# Patient Record
Sex: FEMALE | Race: WHITE | Employment: STUDENT | URBAN - METROPOLITAN AREA
[De-identification: names, ages, dates, MRNs, and addresses within clinical notes are randomized per-mention and may not be internally consistent; named-entity substitution may affect disease eponyms.]

---
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
## 2022-01-08 ENCOUNTER — TELEPHONE (OUTPATIENT)
Dept: INTERNAL MEDICINE CLINIC | Age: 22
End: 2022-01-08

## 2022-01-17 ENCOUNTER — TELEPHONE (OUTPATIENT)
Dept: INTERNAL MEDICINE CLINIC | Age: 22
End: 2022-01-17

## 2022-01-17 NOTE — TELEPHONE ENCOUNTER
Left message for Sunshine Crooked on her voicemail. The insurance company is apparently requesting additional information regarding procedure code [id number] and I am unsure what that is.

## 2022-01-20 ENCOUNTER — OFFICE VISIT (OUTPATIENT)
Dept: PSYCHIATRY | Age: 22
End: 2022-01-20
Payer: COMMERCIAL

## 2022-01-20 VITALS
WEIGHT: 127.2 LBS | DIASTOLIC BLOOD PRESSURE: 82 MMHG | RESPIRATION RATE: 12 BRPM | BODY MASS INDEX: 22.18 KG/M2 | HEART RATE: 60 BPM | SYSTOLIC BLOOD PRESSURE: 116 MMHG

## 2022-01-20 DIAGNOSIS — F43.10 PTSD (POST-TRAUMATIC STRESS DISORDER): ICD-10-CM

## 2022-01-20 DIAGNOSIS — F31.62 BIPOLAR DISORDER, CURRENT EPISODE MIXED, MODERATE (HCC): Primary | ICD-10-CM

## 2022-01-20 DIAGNOSIS — F40.10 SOCIAL ANXIETY DISORDER: ICD-10-CM

## 2022-01-20 PROCEDURE — 99214 OFFICE O/P EST MOD 30 MIN: CPT | Performed by: STUDENT IN AN ORGANIZED HEALTH CARE EDUCATION/TRAINING PROGRAM

## 2022-01-20 PROCEDURE — G8428 CUR MEDS NOT DOCUMENT: HCPCS | Performed by: STUDENT IN AN ORGANIZED HEALTH CARE EDUCATION/TRAINING PROGRAM

## 2022-01-20 PROCEDURE — G8482 FLU IMMUNIZE ORDER/ADMIN: HCPCS | Performed by: STUDENT IN AN ORGANIZED HEALTH CARE EDUCATION/TRAINING PROGRAM

## 2022-01-20 PROCEDURE — G8420 CALC BMI NORM PARAMETERS: HCPCS | Performed by: STUDENT IN AN ORGANIZED HEALTH CARE EDUCATION/TRAINING PROGRAM

## 2022-01-20 PROCEDURE — 1036F TOBACCO NON-USER: CPT | Performed by: STUDENT IN AN ORGANIZED HEALTH CARE EDUCATION/TRAINING PROGRAM

## 2022-01-20 RX ORDER — LITHIUM CARBONATE 300 MG
600 TABLET ORAL DAILY
Qty: 28 TABLET | Refills: 0 | Status: SHIPPED | OUTPATIENT
Start: 2022-01-20 | End: 2022-02-03 | Stop reason: SDUPTHER

## 2022-01-20 RX ORDER — HYDROXYZINE HYDROCHLORIDE 25 MG/1
25 TABLET, FILM COATED ORAL DAILY PRN
Qty: 14 TABLET | Refills: 0 | Status: SHIPPED | OUTPATIENT
Start: 2022-01-20 | End: 2022-03-09 | Stop reason: SDUPTHER

## 2022-01-20 ASSESSMENT — ENCOUNTER SYMPTOMS
EYES NEGATIVE: 1
RESPIRATORY NEGATIVE: 1
GASTROINTESTINAL NEGATIVE: 1
ALLERGIC/IMMUNOLOGIC NEGATIVE: 1

## 2022-01-20 ASSESSMENT — ANXIETY QUESTIONNAIRES
GAD7 TOTAL SCORE: 21
5. BEING SO RESTLESS THAT IT IS HARD TO SIT STILL: 3
3. WORRYING TOO MUCH ABOUT DIFFERENT THINGS: 3
2. NOT BEING ABLE TO STOP OR CONTROL WORRYING: 3
7. FEELING AFRAID AS IF SOMETHING AWFUL MIGHT HAPPEN: 3
IF YOU CHECKED OFF ANY PROBLEMS ON THIS QUESTIONNAIRE, HOW DIFFICULT HAVE THESE PROBLEMS MADE IT FOR YOU TO DO YOUR WORK, TAKE CARE OF THINGS AT HOME, OR GET ALONG WITH OTHER PEOPLE: EXTREMELY DIFFICULT
1. FEELING NERVOUS, ANXIOUS, OR ON EDGE: 3
6. BECOMING EASILY ANNOYED OR IRRITABLE: 3
4. TROUBLE RELAXING: 3

## 2022-01-20 ASSESSMENT — PATIENT HEALTH QUESTIONNAIRE - PHQ9
SUM OF ALL RESPONSES TO PHQ QUESTIONS 1-9: 27
1. LITTLE INTEREST OR PLEASURE IN DOING THINGS: 3
2. FEELING DOWN, DEPRESSED OR HOPELESS: 3
7. TROUBLE CONCENTRATING ON THINGS, SUCH AS READING THE NEWSPAPER OR WATCHING TELEVISION: 3
4. FEELING TIRED OR HAVING LITTLE ENERGY: 3
5. POOR APPETITE OR OVEREATING: 3
SUM OF ALL RESPONSES TO PHQ QUESTIONS 1-9: 27
6. FEELING BAD ABOUT YOURSELF - OR THAT YOU ARE A FAILURE OR HAVE LET YOURSELF OR YOUR FAMILY DOWN: 3
SUM OF ALL RESPONSES TO PHQ QUESTIONS 1-9: 27
3. TROUBLE FALLING OR STAYING ASLEEP: 3
SUM OF ALL RESPONSES TO PHQ9 QUESTIONS 1 & 2: 6
8. MOVING OR SPEAKING SO SLOWLY THAT OTHER PEOPLE COULD HAVE NOTICED. OR THE OPPOSITE, BEING SO FIGETY OR RESTLESS THAT YOU HAVE BEEN MOVING AROUND A LOT MORE THAN USUAL: 3
9. THOUGHTS THAT YOU WOULD BE BETTER OFF DEAD, OR OF HURTING YOURSELF: 3
SUM OF ALL RESPONSES TO PHQ QUESTIONS 1-9: 24

## 2022-01-20 ASSESSMENT — COLUMBIA-SUICIDE SEVERITY RATING SCALE - C-SSRS
6. HAVE YOU EVER DONE ANYTHING, STARTED TO DO ANYTHING, OR PREPARED TO DO ANYTHING TO END YOUR LIFE?: NO
2. HAVE YOU ACTUALLY HAD ANY THOUGHTS OF KILLING YOURSELF?: YES
3. HAVE YOU BEEN THINKING ABOUT HOW YOU MIGHT KILL YOURSELF?: YES
5. HAVE YOU STARTED TO WORK OUT OR WORKED OUT THE DETAILS OF HOW TO KILL YOURSELF? DO YOU INTEND TO CARRY OUT THIS PLAN?: YES
4. HAVE YOU HAD THESE THOUGHTS AND HAD SOME INTENTION OF ACTING ON THEM?: NO
1. WITHIN THE PAST MONTH, HAVE YOU WISHED YOU WERE DEAD OR WISHED YOU COULD GO TO SLEEP AND NOT WAKE UP?: YES

## 2022-01-20 NOTE — PROGRESS NOTES
PSYCHIATRY PROGRESS NOTE    Romel Klein  2000  01/20/22  Face to Face time: 35 minutes  PCP: Laban Mcardle, MD    CC:   Chief Complaint   Patient presents with    Depression     Patient is a 74-year-old female with significant past medical history of spondylosis and choreiform movements who presents to the outpatient psychiatric clinic today for evaluation of depression, anxiety, manic behaviors, and concerns of ADHD. A:  Patient's presentation today appears to be representative of incomplete coverage of her depressive moods by the addition of the lithium from last visit. Whether this is secondary to her underlying bipolar disorder or the recent traumatic loss of her good friend is unclear at this time. Given the severity of the patient's symptoms, there is further medication management that is merited.     Diagnosis:  Bipolar 1 disorder, current episode moderate mixed symptoms  PTSD  Social anxiety disorder  Cannabis use disorder    P:   1. We will plan to increase the patient's lithium from 300 mg to 600 mg at this time for better coverage of her bipolar depressive symptoms. 2.  We will prescribe a 2-week course of hydroxyzine 25 mg to be used as needed daily for anxiety episodes. Patient was cautioned on potential side effects of this medication including dry mouth, sedation, abnormal thoughts. 3.  We will plan to have close follow-up of the patient for management of the continued suicidal ideations as well as mood dysregulations. 4.  Patient will contact her insurance company to determine potential eligible therapy options that may be in the community. She was advised to consider options that may be in the realm of supportive or CBT based psychotherapy.     Medication Monitoring:    - PDMP reviewed: Zolpidem prescription filled on 12/23/2021 for 5-day prescription    Follow-up: 2 weeks    Safety: Pt was counseled on the potential for increased suicidal ideations and advised on potential options for dealing with these including hotlines, calling the office, or going to the nearest emergency room. __________________________________________________________________________    S:   Patient states that despite her previous message saying that she was experiencing a improvement in her symptomology, she has actually maintained at a depressive state for a prolonged period of time. She identified that this may have stemmed from a close friend of hers who committed suicide on New Year's in the setting of alcohol use. His  was noted to be last week. Along with this, the patient has experienced a worsening of her mood, an increase in her sleep to the point where she will often be in the bed for 16 to 17 hours. This is consistent with her prior descriptions of her depressive episodes. She does state that she is making an effort to continue to go to classes, however she notes that she is making inattentive errors in the classes when she knows that she could/should be doing better. The only thing that the patient identifies is slightly better is that she no longer is experiencing the auditory hallucinations that she had described at the prior visit. These had been associated previously with her severe mood states including both the manic and depressive states. She identified that they have not been as present this time and have not been contributing to a worsening of her mood. She does, however, state that there is a possible increase in dissociative episodes as she feels that she loses track of time more often now without explanation. At today's evaluation, the patient denied that she has had suicidal ideations today but noted them within the past week. She stated that those ideations were mostly passive in nature without a specific plan or intent identified. Patient denied any homicidal ideations at this time. ROS:  Review of Systems   Constitutional: Positive for fatigue. HENT: Negative. Eyes: Negative. Respiratory: Negative. Cardiovascular: Negative. Gastrointestinal: Negative. Genitourinary: Negative. Musculoskeletal: Negative. Skin: Negative. Allergic/Immunologic: Negative. Neurological: Negative. Hematological: Negative. Psychiatric/Behavioral: Positive for decreased concentration, dysphoric mood, sleep disturbance and suicidal ideas. The patient is nervous/anxious. Brief Medical Hx:   Patient Active Problem List   Diagnosis    PTSD (post-traumatic stress disorder)    Bipolar disorder, current episode mixed, moderate (HCC)    Social anxiety disorder        Brief Psych Hx:  Hosp: Denied  Diagnoses: Major depression  Med trials: Fluoxetine, trazodone, bupropion, sertraline  Outpt: History of multiple therapists however none currently  NSSI: Burning (lighter/pan)  Suicide Attempts: Denied    O:  Wt Readings from Last 3 Encounters:   01/20/22 127 lb 3.2 oz (57.7 kg) (50 %, Z= 0.00)*   12/23/21 125 lb 3.2 oz (56.8 kg) (46 %, Z= -0.09)*   12/22/21 122 lb 9.6 oz (55.6 kg) (41 %, Z= -0.22)*     * Growth percentiles are based on CDC (Girls, 2-20 Years) data. Vitals:    01/20/22 1500   BP: 116/82   Site: Right Upper Arm   Pulse: 60   Resp: 12   Weight: 127 lb 3.2 oz (57.7 kg)       Mental Status Exam:   Appearance:    Appropriately dressed  Motor: No abnormal movements, tics or mannerisms.   Eye Contact: Generally good  Speech:    Soft tone, mildly slowed rate  Language:   Appropriate diction  Mood/Affect:   \"Very depressed\"/blunted affect that converged on being flat at times  Thought Process:    Linear, logical  Thought Content:    Depressive/anxious content predominating, no SI/HI verbalized  Hallucinations:   Denied, not seem to be responding to internal stimuli  Associations:   Intact, no episodes of dissociations occurring during the visit  Attention/Concentration:   Intact  Orientation:    Alert and oriented x4  Memory: Intact  Fund of Knowledge:    Appropriate for age and education  Insight/Judgement:   Intact/intact      MOHAN-7 SCREENING 1/20/2022 12/23/2021   Feeling nervous, anxious, or on edge Nearly every day Nearly every day   Not being able to stop or control worrying Nearly every day Nearly every day   Worrying too much about different things Nearly every day Nearly every day   Trouble relaxing Nearly every day Nearly every day   Being so restless that it is hard to sit still Nearly every day Nearly every day   Becoming easily annoyed or irritable Nearly every day Nearly every day   Feeling afraid as if something awful might happen Nearly every day Nearly every day   MOHAN-7 Total Score 21 21     PHQ-9 Questionaire 1/20/2022 12/23/2021   Little interest or pleasure in doing things 3 3   Feeling down, depressed, or hopeless 3 3   Trouble falling or staying asleep, or sleeping too much 3 3   Feeling tired or having little energy 3 3   Poor appetite or overeating 3 3   Feeling bad about yourself - or that you are a failure or have let yourself or your family down 3 3   Trouble concentrating on things, such as reading the newspaper or watching television 3 3   Moving or speaking so slowly that other people could have noticed.  Or the opposite - being so fidgety or restless that you have been moving around a lot more than usual 3 3   Thoughts that you would be better off dead, or of hurting yourself in some way 3 1   PHQ-9 Total Score 27 25   If you checked off any problems, how difficult have these problems made it for you to do your work, take care of things at home, or get along with other people? - 3        Labs:     Orders Only on 09/29/2021   Component Date Value Ref Range Status    [facility] 09/29/2021 7.0  mIU/mL Final    Comment: Default Normal Ranges    Female                        Male  Follicular:  5.9-96.3      1.4-18.1  Midcycle:    3.4-33.4  Luteal:      1.5-9.1  Pregnant:    <0.3  Postmeno:    23.0-116.3      LH 09/29/2021 16.0  mIU/mL Final    Comment:                     Default Normal Ranges    Female                   Male  Follic:  0.7-05.7        1.5-9.3  Midcycle:  8.7-76.3  Luteal:  0.5-16.9  Pregnant:  <0.1-1.5  Postmeno:  15.9-54  Contracep:  0.7-5.6                         Age Related Normal Ranges        0 to 6 Years  Female:  Not Established  Male:    Not Established          15 Years  Female:  <0.1-6.0  Male:    <0.1-6.0          21 Years  Female:                   Male: Follic:  9.3-12.2         Not Established  Midcycle:  8.7-76.3  Luteal:  0.5-16.9  Pregnant:  <0.1-1.15  Postmeno:  15.9-54  Contracep:  0.7-5.6         79 Years  Female:                  Male: Follic:  3.7-11.3        1.5-9.3  Midcycle:  8.7-76.3  Luteal:  0.5-16.9  Pregnant:  <0.1-1.5  Postmeno:  15.9-54  Contracep:  0.7-5.6         89 Years  Female:                  Male:   Follic:  0.2-75.3        3.1-34.6  Midcycle:  8.7-76.3  Luteal:  0.5-16.9  Pregnant:  <0.1-1.5  Postmeno:  15.9-54  Contracep:  0.7-5.6            Prolactin 09/29/2021 6.8  ng/mL Final    Comment: Default Normal Ranges    Female                     Male  Nonpregnant: 2.8-29.2      2.1-17.7  Pregnant:  9.7-208.5  Postmeno:  1.8-20.3      TSH 09/29/2021 1.07  0.43 - 4.00 uIU/mL Final    WBC 09/29/2021 9.0  4.0 - 11.0 K/uL Final    RBC 09/29/2021 3.84* 4.00 - 5.20 M/uL Final    Hemoglobin 09/29/2021 12.4  12.0 - 16.0 g/dL Final    Hematocrit 09/29/2021 37.3  36.0 - 48.0 % Final    MCV 09/29/2021 97.1  80.0 - 100.0 fL Final    MCH 09/29/2021 32.3  26.0 - 34.0 pg Final    MCHC 09/29/2021 33.3  31.0 - 36.0 g/dL Final    RDW 09/29/2021 13.4  12.4 - 15.4 % Final    Platelets 42/58/4801 247  135 - 450 K/uL Final    MPV 09/29/2021 9.0  5.0 - 10.5 fL Final    Ferritin 09/29/2021 23.1  15.0 - 150.0 ng/mL Final    Iron 09/29/2021 121  37 - 145 ug/dL Final    TIBC 09/29/2021 352  260 - 445 ug/dL Final    Iron Saturation 09/29/2021 34  15 - 50 % Final    Cholesterol, Total 09/29/2021 183  0 - 199 mg/dL Final    Triglycerides 09/29/2021 59  0 - 150 mg/dL Final    HDL 09/29/2021 89* 40 - 60 mg/dL Final    LDL Calculated 09/29/2021 82  <100 mg/dL Final    VLDL Cholesterol Calculated 09/29/2021 12  Not Established mg/dL Final    Sodium 09/29/2021 140  136 - 145 mmol/L Final    Potassium 09/29/2021 4.4  3.5 - 5.1 mmol/L Final    Chloride 09/29/2021 101  99 - 110 mmol/L Final    CO2 09/29/2021 27  21 - 32 mmol/L Final    Anion Gap 09/29/2021 12  3 - 16 Final    Glucose 09/29/2021 76  70 - 99 mg/dL Final    BUN 09/29/2021 15  7 - 20 mg/dL Final    CREATININE 09/29/2021 0.9  0.6 - 1.1 mg/dL Final    GFR Non- 09/29/2021 >60  >60 Final    Comment: >60 mL/min/1.73m2 EGFR, calc. for ages 25 and older using the  MDRD formula (not corrected for weight), is valid for stable  renal function.  GFR  09/29/2021 >60  >60 Final    Comment: Chronic Kidney Disease: less than 60 ml/min/1.73 sq.m. Kidney Failure: less than 15 ml/min/1.73 sq.m. Results valid for patients 18 years and older.       Calcium 09/29/2021 10.5  8.3 - 10.6 mg/dL Final    Total Protein 09/29/2021 7.1  6.4 - 8.2 g/dL Final    Albumin 09/29/2021 5.0  3.4 - 5.0 g/dL Final    Albumin/Globulin Ratio 09/29/2021 2.4* 1.1 - 2.2 Final    Total Bilirubin 09/29/2021 0.6  0.0 - 1.0 mg/dL Final    Alkaline Phosphatase 09/29/2021 67  40 - 129 U/L Final    ALT 09/29/2021 16  10 - 40 U/L Final    AST 09/29/2021 17  15 - 37 U/L Final    Globulin 09/29/2021 2.1  g/dL Final    Hep C Ab Interp 09/29/2021 Non-reactive  Non-reactive Final    HIV Ag/Ab 09/29/2021 Non-Reactive  Non-reactive Final    HIV-1 Antibody 09/29/2021 Non-Reactive  Non-reactive Final    HIV ANTIGEN 09/29/2021 Non-Reactive  Non-reactive Final    HIV-2 Ab 09/29/2021 Non-Reactive  Non-reactive Final    17-OH PROGESTERONE LCMS 09/29/2021 91.41  <=206.00 ng/dL Final    Comment: INTERPRETIVE INFORMATION for 17-Hydroxyprogesterone in females: Follicular                  15 to 70 ng/dL  Luteal                      35 to 290 ng/dL  REFERENCE INTERVAL: 17-Hydroxyprogesterone Qnt, HPLC-MS/MS  Access complete set of age- and/or gender-specific reference intervals  for  this test in the [facility] Test Directory ([place]). This test was developed and its performance characteristics determined  by  [facility]. It has not been cleared or approved by the [employer]  and  Drug Administration. This test was performed in a CLIA certified  laboratory  and is intended for clinical purposes. Performed By: [facility] [address]  [place], [address] [facility]  : Raya Sosa.  Maryse Ford MD      Testosterone 09/29/2021 44  20 - 70 ng/dL Final    [address] [phone]    DHEAS (DHEA Sulfate) 09/29/2021 189.0  63 - 373 ug/dL Final    [address] [phone]     Lithium level: 1/20/2022 0.5    EKG: None on file        Yanni Robin MD  Psychiatrist

## 2022-02-02 NOTE — PROGRESS NOTES
PSYCHIATRY PROGRESS NOTE    Laura Do  2000  02/03/22  Face to Face time: 30 minutes  Virtual visit w/ audio/video capabilities  PCP: Elfego Madera MD    CC:   Chief Complaint   Patient presents with    Manic Behavior     Patient is a 79-year-old female with significant past medical history of spondylosis and choreiform movements who presents to the outpatient psychiatric clinic today for evaluation of depression, anxiety, manic behaviors, and concerns of ADHD. A:  Patient's presentation today appears indicative of having recently had manic episode that lasted approximately 3 days in length. Notable that during the manic episode she also experienced suicidal ideations without clear plan or intent. Those are not present today however it is a concern that even with lithium that she has experienced these.     Diagnosis:  Bipolar 1 disorder, most recent episode manic  PTSD  Social anxiety disorder  Cannabis use disorder    P:   1. We will plan to maintain the patient on lithium 600 mg daily for treatment of bipolar disorder at this time. Lithium level has also been ordered to determine whether this medication can be increased further. Last lithium level 0.5 indicative of subtherapeutic level, however the dosage of her lithium has been increased since that time and a new level will need to be obtained to determine where she falls in the therapeutic index. 2.  We will plan to start olanzapine 5 mg nightly for treatment of rani. This is also geared to help treat the patient's concerns of insomnia and appetite suppression. Patient was cautioned on potential orthostatic changes with this medication as well as its potential for weight gain. She was advised of the potential concerning side effect of NMS that can sometimes occur and advised to seek medical help should this occur for her.   3.  The patient was reminded of potential scenarios that may require inpatient hospitalization including severe manic episodes that do not and, suicidal thoughts, severe depressive episodes leading to immobility, severe hallucinations. Patient was advised to consider contacting this writer either through my chart or through the clinic should she feel that her moods begin to swing up or down and at least receive advice. Patient indicated understanding of such. Medication Monitoring:    - PDMP reviewed: No interval change    Follow-up: 2 weeks or sooner as needed. Patient will also plan to send a message after approximately 1 week to advise writer on the progress of medication    Safety: Pt was counseled on the potential for increased suicidal ideations and advised on potential options for dealing with these including hotlines, calling the office, or going to the nearest emergency room. __________________________________________________________________________    S:   Patient identified that she had been doing mildly better up until the middle of last week. She identified that from Tuesday to Friday of last week that she experienced only minimal sleep each night if at all. She noted that there was a decreased need for sleep and that she was \"almost afraid of my bed\". She identified that during the time that she would be up in the middle of the night that she would watch TV, drive to her home and work out, and engaging coloring. She also noted that she would go to see friends that were up. Patient denied any additional substances that were in play during this time, noting even that she has stopped smoking marijuana as of this past Sunday (after the manic episode had occurred). Patient acknowledged that during the manic episode she was still going to class but that she would have what appeared to be near dissociative episodes where she would experience almost an out of body feeling. Patient also identified that during depressive episodes she feels that she zones out more in class.   During this most recent manic episode, the patient identified that she did experience a sensation of \"needing to jump out of my body\", which she said led her to having thoughts about wanting to hurt herself or to die. She did not come up with a plan, no intent was had, and no actual attempts were made. However, the patient identified that that particular scenario had only happened 2 times prior, noting that this current one was not as severe as the times prior. Patient also indicated that she does intermittently still experience auditory hallucinations that she can barely make out, noting that they have also dampened since the lithium has been in place. Additional information provided by the patient indicated that since stopping the marijuana she has also struggled with having decreased appetite and motivation. Patient denied any suicidality, homicidality, AVH during the visit today. ROS:  Review of Systems   Constitutional: Positive for activity change and appetite change. HENT: Negative. Eyes: Negative. Respiratory: Negative. Cardiovascular: Negative. Gastrointestinal: Negative. Endocrine: Negative. Genitourinary: Negative. Musculoskeletal: Negative. Skin: Negative. Allergic/Immunologic: Negative. Neurological: Negative. Psychiatric/Behavioral: Positive for behavioral problems, dysphoric mood and sleep disturbance. The patient is nervous/anxious.          Brief Medical Hx:   Patient Active Problem List   Diagnosis    PTSD (post-traumatic stress disorder)    Bipolar disorder, current episode mixed, moderate (HCC)    Social anxiety disorder        Brief Psych Hx:  Hosp: Denied  Diagnoses: Major depression  Med trials: Fluoxetine, trazodone, bupropion, sertraline  Outpt: History of multiple therapists however none currently  NSSI: Burning (lighter/pan)  Suicide Attempts: Denied    O:  Wt Readings from Last 3 Encounters:   01/20/22 127 lb 3.2 oz (57.7 kg) (50 %, Z= 0.00)*   12/23/21 125 lb 3.2 oz (56.8 kg) (46 %, Z= -0.09)*   12/22/21 122 lb 9.6 oz (55.6 kg) (41 %, Z= -0.22)*     * Growth percentiles are based on [facility] (Girls, 2-20 Years) data. Weight: Patient reported weight of 125lbs. No other vitals were filed due to the visit being virtual.    Mental Status Exam:   Appearance:    Tired, appropriately dressed  Motor: No abnormal movements, tics or mannerisms. Eye Contact: Good  Speech:    Regular rate and rhythm  Language:   Appropriate diction  Mood/Affect: \"I guess I am okay\", blunted affect  Thought Process:    Linear, logical  Thought Content:    Depressive content present, no SI/HI  Hallucinations:   Denied, not seem to be responding internal stimuli  Associations:   Intact  Attention/Concentration:   Intact  Orientation:    Alert and oriented x4  Memory:   Intact  Fund of Knowledge:    Appropriate for age and education  Insight/Judgement:   Intact/intact      MOHAN-7 SCREENING 1/20/2022 12/23/2021   Feeling nervous, anxious, or on edge Nearly every day Nearly every day   Not being able to stop or control worrying Nearly every day Nearly every day   Worrying too much about different things Nearly every day Nearly every day   Trouble relaxing Nearly every day Nearly every day   Being so restless that it is hard to sit still Nearly every day Nearly every day   Becoming easily annoyed or irritable Nearly every day Nearly every day   Feeling afraid as if something awful might happen Nearly every day Nearly every day   MOHAN-7 Total Score 21 21   How difficult have these problems made it for you to do your work, take care of things at home, or get along with other people?  Extremely difficult Extremely difficult     PHQ-9 Questionaire 1/20/2022 12/23/2021   Little interest or pleasure in doing things 3 3   Feeling down, depressed, or hopeless 3 3   Trouble falling or staying asleep, or sleeping too much 3 3   Feeling tired or having little energy 3 3   Poor appetite or overeating 3 3   Feeling bad conducted with patient's (and/or legal guardian's) consent. The visit was conducted pursuant to the emergency declaration under the [address] [place] [address] authority and the [employer] and [employer] General Act. Patient identification was verified, and a caregiver was present when appropriate. The patient was located at home in a state where the provider was licensed to provide care. --Shahla Mullins MD on 2/3/2022 at 3:19 PM    An electronic signature was used to authenticate this note.

## 2022-02-03 ENCOUNTER — TELEMEDICINE (OUTPATIENT)
Dept: PSYCHIATRY | Age: 22
End: 2022-02-03
Payer: COMMERCIAL

## 2022-02-03 DIAGNOSIS — F31.62 BIPOLAR DISORDER, CURRENT EPISODE MIXED, MODERATE (HCC): Primary | ICD-10-CM

## 2022-02-03 PROCEDURE — G8482 FLU IMMUNIZE ORDER/ADMIN: HCPCS | Performed by: STUDENT IN AN ORGANIZED HEALTH CARE EDUCATION/TRAINING PROGRAM

## 2022-02-03 PROCEDURE — 1036F TOBACCO NON-USER: CPT | Performed by: STUDENT IN AN ORGANIZED HEALTH CARE EDUCATION/TRAINING PROGRAM

## 2022-02-03 PROCEDURE — G8420 CALC BMI NORM PARAMETERS: HCPCS | Performed by: STUDENT IN AN ORGANIZED HEALTH CARE EDUCATION/TRAINING PROGRAM

## 2022-02-03 PROCEDURE — G8428 CUR MEDS NOT DOCUMENT: HCPCS | Performed by: STUDENT IN AN ORGANIZED HEALTH CARE EDUCATION/TRAINING PROGRAM

## 2022-02-03 PROCEDURE — 99214 OFFICE O/P EST MOD 30 MIN: CPT | Performed by: STUDENT IN AN ORGANIZED HEALTH CARE EDUCATION/TRAINING PROGRAM

## 2022-02-03 RX ORDER — LITHIUM CARBONATE 300 MG
600 TABLET ORAL DAILY
Qty: 60 TABLET | Refills: 1 | Status: SHIPPED | OUTPATIENT
Start: 2022-02-03 | End: 2022-03-09 | Stop reason: SDUPTHER

## 2022-02-03 RX ORDER — OLANZAPINE 5 MG/1
5 TABLET ORAL NIGHTLY
Qty: 30 TABLET | Refills: 1 | Status: SHIPPED | OUTPATIENT
Start: 2022-02-03 | End: 2022-03-09

## 2022-02-03 ASSESSMENT — ENCOUNTER SYMPTOMS
ALLERGIC/IMMUNOLOGIC NEGATIVE: 1
EYES NEGATIVE: 1
RESPIRATORY NEGATIVE: 1
GASTROINTESTINAL NEGATIVE: 1

## 2022-03-07 ASSESSMENT — ENCOUNTER SYMPTOMS
ALLERGIC/IMMUNOLOGIC NEGATIVE: 1
GASTROINTESTINAL NEGATIVE: 1
EYES NEGATIVE: 1
RESPIRATORY NEGATIVE: 1

## 2022-03-07 NOTE — PROGRESS NOTES
PSYCHIATRY PROGRESS NOTE    Tima Cohn  2000  03/9/22  Face to Face time: 30 minutes  PCP: Gallo Hernandez MD    CC:   Chief Complaint   Patient presents with    Depression    Manic Behavior    Anxiety     Patient is a 72-year-old female with significant past medical history of spondylosis and choreiform movements who presents to the outpatient psychiatric clinic today for evaluation of depression, anxiety, manic behaviors, and concerns of ADHD. A:  Patient's presentation today appears mildly improved from a depression standpoint. That being said, the patient's report of a previous manic episode occurring 1.5 weeks ago is disturbing and indicative of likely incomplete control of the patient's bipolar disorder. We will work to adjust her medications accordingly.     Diagnosis:  Bipolar 1 disorder, current episode moderate mixed symptoms  PTSD  Social anxiety disorder  Cannabis use disorder    P:   1. We will maintain the patient on 600 mg of lithium daily at this time. Patient is obtaining lithium labs today that may indicate whether this medication can be changed further in the future. 2.  We will increase the patient's olanzapine to 10mg nightly for better control of the manic episodes she experiences. The patient was advised to contact this writer if she feels that she is entering a manic episode, as this 10 mg medication could be changed on a short-term basis to better cover her symptoms. 3.  The patient will continue on hydroxyzine 25 mg daily as needed for anxiety. This medication was refilled at this visit. 4.  Patient was advised of potential medications that could be used for PTSD that may actually help with her sleep cycle.   One of these medications was prazosin, and patient was advised that should she wish to start this medication in the interim between appointments she need only contact this writer and a discussion of whether it is appropriate at that time will be had.    Medication Monitoring:    - PDMP reviewed: No recent prescriptions    Follow-up: 4 weeks    Safety: Pt was counseled on the potential for increased suicidal ideations and advised on potential options for dealing with these including hotlines, calling the office, or going to the nearest emergency room. __________________________________________________________________________    S:   Patient endorsed that she feels the lithium is generally helping her most days, however she still has some days where she \"is not there mentally\". She notes that there are days where she does get the sense that if she were to leave her bed that \"something bad would happen\". The patient denies that this is a thought placed in her head from a hallucination, states that the hallucinations that she had been having have been significantly decreased to the point where she does not notice them anymore. She does note intermittent difficulties with maintaining sleep, attributing this somewhat to the fact that she has virtually stopped using marijuana. She also attributes a decrease to her appetite to this as well. Patient denied any SI/HI during the visit today. Significantly, approximately 1.5 weeks prior to this evaluation the patient did endorse having a manic episode that lasted approximately 3 days. During this time she did engage in excessive talking as well as excessive exercise, noting that she will normally walk on the treadmill but instead decided in the middle of the night to randomly go for a 5 mile run which she never does. Patient also notes that her moods became slightly aggressive during this time as well. ROS:  Review of Systems   Constitutional: Positive for activity change. HENT: Negative. Eyes: Negative. Respiratory: Negative. Cardiovascular: Negative. Gastrointestinal: Negative. Genitourinary: Negative. Musculoskeletal: Negative. Skin: Negative. Allergic/Immunologic: Negative. Neurological: Negative. Hematological: Negative. Psychiatric/Behavioral: Positive for dysphoric mood and sleep disturbance. The patient is nervous/anxious. Brief Medical Hx:   Patient Active Problem List   Diagnosis    PTSD (post-traumatic stress disorder)    Bipolar disorder, current episode mixed, moderate (HCC)    Social anxiety disorder        Brief Psych Hx:  Hosp: Denied  Diagnoses: Major depression  Med trials: Fluoxetine, trazodone, bupropion, sertraline  Outpt: History of multiple therapists however none currently  NSSI: Burning (lighter/pan)  Suicide Attempts: Denied    O:  Wt Readings from Last 3 Encounters:   03/09/22 133 lb 3.2 oz (60.4 kg) (60 %, Z= 0.25)*   01/20/22 127 lb 3.2 oz (57.7 kg) (50 %, Z= 0.00)*   12/23/21 125 lb 3.2 oz (56.8 kg) (46 %, Z= -0.09)*     * Growth percentiles are based on [facility] (Girls, 2-20 Years) data. Vitals:    03/09/22 0930   BP: 115/70   Pulse: 68   Resp: 12   Weight: 133 lb 3.2 oz (60.4 kg)       Mental Status Exam:   Appearance:    Appropriately dressed  Motor: No abnormal movements, tics or mannerisms.   Eye Contact: Fair  Speech:    Appropriate rate and rhythm  Language:   Appropriate diction  Mood/Affect: \"Still little depressed\"/blunted  Thought Process:   Linear, logical  Thought Content:    Depressive content predominating, no SI/HI  Hallucinations:   Denied, not seem to be responding to internal stimuli  Associations:   Intact  Attention/Concentration:   Intact  Orientation:    Alert and oriented x4  Memory:   Intact  Fund of Knowledge:    Appropriate for age and education  Insight/Judgement:   Intact/intact        MOHAN-7 SCREENING 3/9/2022 1/20/2022   Feeling nervous, anxious, or on edge More than half the days Nearly every day   Not being able to stop or control worrying More than half the days Nearly every day   Worrying too much about different things More than half the days Nearly every day   Trouble relaxing Nearly every day Nearly every day   Being so restless that it is hard to sit still Nearly every day Nearly every day   Becoming easily annoyed or irritable Nearly every day Nearly every day   Feeling afraid as if something awful might happen More than half the days Nearly every day   MOHAN-7 Total Score 17 21   How difficult have these problems made it for you to do your work, take care of things at home, or get along with other people? - Extremely difficult     PHQ-9 Questionaire 3/9/2022 1/20/2022   Little interest or pleasure in doing things 2 3   Feeling down, depressed, or hopeless 1 3   Trouble falling or staying asleep, or sleeping too much 3 3   Feeling tired or having little energy 3 3   Poor appetite or overeating 2 3   Feeling bad about yourself - or that you are a failure or have let yourself or your family down 2 3   Trouble concentrating on things, such as reading the newspaper or watching television 3 3   Moving or speaking so slowly that other people could have noticed. Or the opposite - being so fidgety or restless that you have been moving around a lot more than usual 3 3   Thoughts that you would be better off dead, or of hurting yourself in some way 0 3   PHQ-9 Total Score 19 27   If you checked off any problems, how difficult have these problems made it for you to do your work, take care of things at home, or get along with other people?  - -        Labs:     Orders Only on 03/09/2022   Component Date Value Ref Range Status    Lithium Lvl 03/09/2022 0.3* 0.6 - 1.2 mmol/L Final    Lithium Dose Amount 03/09/2022 Unknown   Final     Lithium level: 1/20/2022 0.5    EKG: None on file        Trina Arellano MD  Psychiatrist

## 2022-03-09 ENCOUNTER — OFFICE VISIT (OUTPATIENT)
Dept: PSYCHIATRY | Age: 22
End: 2022-03-09
Payer: COMMERCIAL

## 2022-03-09 DIAGNOSIS — F31.62 BIPOLAR DISORDER, CURRENT EPISODE MIXED, MODERATE (HCC): Primary | ICD-10-CM

## 2022-03-09 DIAGNOSIS — F43.10 PTSD (POST-TRAUMATIC STRESS DISORDER): ICD-10-CM

## 2022-03-09 PROCEDURE — 1036F TOBACCO NON-USER: CPT | Performed by: STUDENT IN AN ORGANIZED HEALTH CARE EDUCATION/TRAINING PROGRAM

## 2022-03-09 PROCEDURE — G8428 CUR MEDS NOT DOCUMENT: HCPCS | Performed by: STUDENT IN AN ORGANIZED HEALTH CARE EDUCATION/TRAINING PROGRAM

## 2022-03-09 PROCEDURE — 99214 OFFICE O/P EST MOD 30 MIN: CPT | Performed by: STUDENT IN AN ORGANIZED HEALTH CARE EDUCATION/TRAINING PROGRAM

## 2022-03-09 PROCEDURE — G8420 CALC BMI NORM PARAMETERS: HCPCS | Performed by: STUDENT IN AN ORGANIZED HEALTH CARE EDUCATION/TRAINING PROGRAM

## 2022-03-09 PROCEDURE — G8482 FLU IMMUNIZE ORDER/ADMIN: HCPCS | Performed by: STUDENT IN AN ORGANIZED HEALTH CARE EDUCATION/TRAINING PROGRAM

## 2022-03-09 RX ORDER — LITHIUM CARBONATE 300 MG
600 TABLET ORAL DAILY
Qty: 60 TABLET | Refills: 1 | Status: SHIPPED | OUTPATIENT
Start: 2022-03-09 | End: 2022-04-11 | Stop reason: SDUPTHER

## 2022-03-09 RX ORDER — HYDROXYZINE HYDROCHLORIDE 25 MG/1
25 TABLET, FILM COATED ORAL DAILY PRN
Qty: 14 TABLET | Refills: 0 | Status: SHIPPED | OUTPATIENT
Start: 2022-03-09 | End: 2022-06-08

## 2022-03-09 RX ORDER — OLANZAPINE 10 MG/1
5 TABLET ORAL NIGHTLY
Qty: 30 TABLET | Refills: 2 | Status: SHIPPED | OUTPATIENT
Start: 2022-03-09 | End: 2022-03-10

## 2022-03-09 ASSESSMENT — ANXIETY QUESTIONNAIRES
5. BEING SO RESTLESS THAT IT IS HARD TO SIT STILL: 3
1. FEELING NERVOUS, ANXIOUS, OR ON EDGE: 2
2. NOT BEING ABLE TO STOP OR CONTROL WORRYING: 2
GAD7 TOTAL SCORE: 17
7. FEELING AFRAID AS IF SOMETHING AWFUL MIGHT HAPPEN: 2
3. WORRYING TOO MUCH ABOUT DIFFERENT THINGS: 2
6. BECOMING EASILY ANNOYED OR IRRITABLE: 3
4. TROUBLE RELAXING: 3

## 2022-03-09 ASSESSMENT — PATIENT HEALTH QUESTIONNAIRE - PHQ9
8. MOVING OR SPEAKING SO SLOWLY THAT OTHER PEOPLE COULD HAVE NOTICED. OR THE OPPOSITE, BEING SO FIGETY OR RESTLESS THAT YOU HAVE BEEN MOVING AROUND A LOT MORE THAN USUAL: 3
SUM OF ALL RESPONSES TO PHQ QUESTIONS 1-9: 19
9. THOUGHTS THAT YOU WOULD BE BETTER OFF DEAD, OR OF HURTING YOURSELF: 0
SUM OF ALL RESPONSES TO PHQ QUESTIONS 1-9: 19
SUM OF ALL RESPONSES TO PHQ QUESTIONS 1-9: 19
6. FEELING BAD ABOUT YOURSELF - OR THAT YOU ARE A FAILURE OR HAVE LET YOURSELF OR YOUR FAMILY DOWN: 2
3. TROUBLE FALLING OR STAYING ASLEEP: 3
SUM OF ALL RESPONSES TO PHQ9 QUESTIONS 1 & 2: 3
SUM OF ALL RESPONSES TO PHQ QUESTIONS 1-9: 19
2. FEELING DOWN, DEPRESSED OR HOPELESS: 1
4. FEELING TIRED OR HAVING LITTLE ENERGY: 3
5. POOR APPETITE OR OVEREATING: 2
7. TROUBLE CONCENTRATING ON THINGS, SUCH AS READING THE NEWSPAPER OR WATCHING TELEVISION: 3
1. LITTLE INTEREST OR PLEASURE IN DOING THINGS: 2

## 2022-03-10 VITALS
WEIGHT: 133.2 LBS | BODY MASS INDEX: 23.23 KG/M2 | RESPIRATION RATE: 12 BRPM | SYSTOLIC BLOOD PRESSURE: 115 MMHG | HEART RATE: 68 BPM | DIASTOLIC BLOOD PRESSURE: 70 MMHG

## 2022-03-10 RX ORDER — OLANZAPINE 10 MG/1
10 TABLET ORAL NIGHTLY
Qty: 30 TABLET | Refills: 2 | Status: SHIPPED | OUTPATIENT
Start: 2022-03-10 | End: 2022-05-22 | Stop reason: SDUPTHER

## 2022-04-04 ENCOUNTER — TELEPHONE (OUTPATIENT)
Dept: INTERNAL MEDICINE CLINIC | Age: 22
End: 2022-04-04

## 2022-04-04 ENCOUNTER — OFFICE VISIT (OUTPATIENT)
Dept: INTERNAL MEDICINE CLINIC | Age: 22
End: 2022-04-04
Payer: COMMERCIAL

## 2022-04-04 VITALS
WEIGHT: 129.6 LBS | BODY MASS INDEX: 22.6 KG/M2 | DIASTOLIC BLOOD PRESSURE: 87 MMHG | OXYGEN SATURATION: 93 % | SYSTOLIC BLOOD PRESSURE: 122 MMHG | HEART RATE: 83 BPM

## 2022-04-04 DIAGNOSIS — M25.511 ACUTE PAIN OF RIGHT SHOULDER: Primary | ICD-10-CM

## 2022-04-04 PROCEDURE — G8420 CALC BMI NORM PARAMETERS: HCPCS | Performed by: INTERNAL MEDICINE

## 2022-04-04 PROCEDURE — 1036F TOBACCO NON-USER: CPT | Performed by: INTERNAL MEDICINE

## 2022-04-04 PROCEDURE — G8427 DOCREV CUR MEDS BY ELIG CLIN: HCPCS | Performed by: INTERNAL MEDICINE

## 2022-04-04 PROCEDURE — 99213 OFFICE O/P EST LOW 20 MIN: CPT | Performed by: INTERNAL MEDICINE

## 2022-04-04 RX ORDER — OXYCODONE HYDROCHLORIDE 5 MG/1
5 TABLET ORAL NIGHTLY PRN
Qty: 3 TABLET | Refills: 0 | Status: SHIPPED | OUTPATIENT
Start: 2022-04-04 | End: 2022-04-04

## 2022-04-04 NOTE — TELEPHONE ENCOUNTER
Patient is returning call from our office. Precious Daniel was not at desk. Please have Brian/MA call patient to ask necessary questions to determine if she needs to come for her 2:15 appointment today or go to Urgent Ortho?

## 2022-04-04 NOTE — PATIENT INSTRUCTIONS
1000 mg every 8 hours for shoulder pain       See orthopedics  Get shoulder xray if taking time to get in.  [address]     [email or web address]

## 2022-04-04 NOTE — TELEPHONE ENCOUNTER
LVM for the pt call office to get more information on the appt scheduled     The pt might need Ortho Urgent care

## 2022-04-04 NOTE — TELEPHONE ENCOUNTER
Pt stated that arm did come out of socket but was able to be popped back in     The pt is in a lot of pain and would like a referral due to this happening before

## 2022-04-04 NOTE — PROGRESS NOTES
Patricia Deluca (:  2000) is a 25 y.o. female, here for evaluation of the following chief complaint(s):    Shoulder Pain (R arm )      ASSESSMENT/PLAN:  1. Acute pain of right shoulder  -     Elbert Mccall, , Internal Medicine, [facility]  -     XR SHOULDER RIGHT (MIN 2 VIEWS); Future  Told patient she can take Tylenol 1000 mg every 8 hours as needed for pain. I did look to see if hydrocodone or oxycodone would be options for her at bedtime but there are significant drug interactions with the lithium and Zyprexa so I am unable to prescribe them. Advised ice and use of the shoulder brace to limit mobility. Return if symptoms worsen or fail to improve. SUBJECTIVE/OBJECTIVE:  HPI   Patient states her right shoulder popped out while playing rugby 2 days ago. Another player pulled on her arm and then it dislocated. During the same game she was kicked in her right shoulder by a player's knee. She rates her pain 8 out of 10 and even worse when she is lying down in bed. She states that she rolled on the ground during the game and the shoulder popped back in. This has happened 2 times before. She states initially her pain was not that bad and she was able to go out on Saturday night but the pain got worse after that. Due to being on lithium, her mom checked drug interactions and was concerned about giving her NSAIDs. The patient has not tried Tylenol yet. Her mom, who joined us by phone, states that Felton Lyman can tend to have an addictive personality, including with marijuana, and would prefer not to have anything addictive prescribed. The patient has a right shoulder brace from [email or web address] that she used once before when this happened. She does not complain of numbness or weakness in the right arm.     Past Medical History:   Diagnosis Date    Acne     Anxiety and depression     Arthritis of right hip     Chorea     saw [facility] neurology    Dyslexia     has IEP    Family history of breast cancer     Low back pain     sp esiBilateral L5 spondylolysis with grade 1 spondylolisthesis    MTHFR mutation     heterozygous    Oligomenorrhea     probable pcos       Current Outpatient Medications   Medication Sig Dispense Refill    OLANZapine (ZYPREXA) 10 MG tablet Take 1 tablet by mouth nightly 30 tablet 2    lithium 300 MG tablet Take 2 tablets by mouth daily 60 tablet 1    hydrOXYzine (ATARAX) 25 MG tablet Take 1 tablet by mouth daily as needed for Anxiety 14 tablet 0     No current facility-administered medications for this visit. Physical Exam   Patient is in mild distress due to the pain. Her right shoulder has limited range of motion in all directions especially with adduction, lifting the arm in front of her and internal rotation. Her hand  is mildly weak. Her posture shows her right shoulder dropped lower than her left    On this date 4/4/2022 I have spent 20 minutes reviewing previous notes, test results and face to face with the patient discussing the diagnosis and importance of compliance with the treatment plan as well as documenting on the day of the visit. This note was generated completely or in part utilizing Dragon dictation speech recognition software. Occasionally, words are mistranscribed and despite editing, the text may contain inaccuracies due to incorrect word recognition. If further clarification is needed please contact the office at [phone]          An electronic signature was used to authenticate this note.     --Syed Lyles MD

## 2022-04-07 ENCOUNTER — TELEPHONE (OUTPATIENT)
Dept: ORTHOPEDIC SURGERY | Age: 22
End: 2022-04-07

## 2022-04-07 ENCOUNTER — OFFICE VISIT (OUTPATIENT)
Dept: ORTHOPEDIC SURGERY | Age: 22
End: 2022-04-07
Payer: COMMERCIAL

## 2022-04-07 VITALS — RESPIRATION RATE: 14 BRPM | BODY MASS INDEX: 23.08 KG/M2 | WEIGHT: 135.2 LBS | HEIGHT: 64 IN

## 2022-04-07 DIAGNOSIS — M25.511 RIGHT SHOULDER PAIN, UNSPECIFIED CHRONICITY: Primary | ICD-10-CM

## 2022-04-07 PROCEDURE — G8420 CALC BMI NORM PARAMETERS: HCPCS | Performed by: ORTHOPAEDIC SURGERY

## 2022-04-07 PROCEDURE — G8427 DOCREV CUR MEDS BY ELIG CLIN: HCPCS | Performed by: ORTHOPAEDIC SURGERY

## 2022-04-07 PROCEDURE — 99203 OFFICE O/P NEW LOW 30 MIN: CPT | Performed by: ORTHOPAEDIC SURGERY

## 2022-04-07 RX ORDER — ACETAMINOPHEN 500 MG
1000 TABLET ORAL
COMMUNITY
End: 2022-06-08

## 2022-04-07 RX ORDER — ALBUTEROL SULFATE 90 UG/1
2 AEROSOL, METERED RESPIRATORY (INHALATION) EVERY 6 HOURS PRN
COMMUNITY

## 2022-04-07 NOTE — TELEPHONE ENCOUNTER
ELEAZAR MAURICE regarding MRA RT SHOULDER approval and authorization being valid until 5/22/2022. Patient was instructed that their MRA needs to be scheduled at [facility] . The patient was instructed to contact the facility to schedule  at [phone]. A follow up appointment will need to be scheduled to review the results and treatment plan. [employer] message also sent to patient.

## 2022-04-07 NOTE — PROGRESS NOTES
CHIEF COMPLAINT: Right shoulder pain    History:    Marquita Banegas is a 25 y.o. left handed female referred by Sobeida Peterson MD for Sports Medicine consultation  for evaluation and treatment of Right shoulder pain. This is evaluated as a personal injury. The pain began 5 days ago. Pain is rated as a 5/10. There was an injury. She was playing rugby and feel like she had a anterior shoulder dislocation. Her arm was yanked backwards. This happened again during the same game when she was kneed. She states it self reduced both times. She states she has a history of shoulder dislocation 3 months ago while she was lifting weights in her arm went backwards. She again said that self reduce. She did not seek medical attention. She states she wore a brace. Pain is located laterally and deep in the shoulder. She also notes some neck pain now. No numbness and tingling currently in her hand. She did have that initially after the recent injury. The patient has not had PT. The patient has not had an injection. The patient has not tried NSAIDs because she is on lithium. The patient has tried ice with relief. 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     Past Medical History:   Diagnosis Date    Acne     Anxiety and depression     Arthritis of right hip     Chorea     saw [facility] neurology    Dyslexia     has IEP    Family history of breast cancer     Low back pain     sp esiBilateral L5 spondylolysis with grade 1 spondylolisthesis    MTHFR mutation     heterozygous    Oligomenorrhea     probable pcos       Past Surgical History:   Procedure Laterality Date    TYMPANOSTOMY TUBE PLACEMENT         Current Outpatient Medications on File Prior to Visit   Medication Sig Dispense Refill    acetaminophen (TYLENOL) 500 MG tablet Take 1,000 mg by mouth      albuterol sulfate  (90 Base) MCG/ACT inhaler Inhale 2 puffs into the lungs every 6 hours as needed      OLANZapine (ZYPREXA) 10 MG tablet Take 1 tablet by mouth nightly 30 tablet 2    lithium 300 MG tablet Take 2 tablets by mouth daily 60 tablet 1    hydrOXYzine (ATARAX) 25 MG tablet Take 1 tablet by mouth daily as needed for Anxiety 14 tablet 0     No current facility-administered medications on file prior to visit. Allergies   Allergen Reactions    Zithromax [Azithromycin] Anaphylaxis and Rash    Prozac [Fluoxetine]      Suicidal thoughts       Social History     Socioeconomic History    Marital status: Single     Spouse name: Not on file    Number of children: 0    Years of education: 15    Highest education level: Some college, no degree   Occupational History    Occupation:  - environmental studies     Employer: STUDENT   Tobacco Use    Smoking status: Never Smoker    Smokeless tobacco: Never Used   Substance and Sexual Activity    Alcohol use: Yes     Comment: Social wine drinking    Drug use: Yes     Types: Marijuana (Weed)     Comment: Maximum during freshman year of college was 5 g daily. Currently 1 delta 8 every 3 weeks. Trialed cocaine 1 time, mushrooms 3 times    Sexual activity: Not Currently     Partners: Female     Comment: Identifies as nonbinary. Avoidance behaviors towards sexual contact   Other Topics Concern    Not on file   Social History Narrative    Patient is currently enrolled at , grades are okay with a grade point average of 2.8 (\"pretty good\" by her standards). Patient lives with a male roommate who she is platonic friends with. She came out as nonbinary to her parents a couple of weeks ago. Previously worked at [employer] though not currently, left because employment was too far from [facility]. High school grades were as follows: Freshman year C's, sophomore year B's, douglas year A's, senior year A's and B's. No guns at home, no legal issues currently, no  service for the patient     Social Determinants of Health     Financial Resource Strain: tab.      Physical Examination:      Vital signs:  Resp 14   Ht 5' 3.5\" (1.613 m)   Wt 135 lb 3.2 oz (61.3 kg)   LMP 03/20/2022   BMI 23.57 kg/m²     General:   alert, appears stated age, cooperative and no distress   Right Shoulder   Active ROM:   forward flexion 135, external rotation 45, internal rotation L4. Left shoulder: forward flexion 180, external rotation 80, internal rotation L4. Passive ROM:  forward flexion 180    Joint Tenderness:   Joint line, and mild lateral acromial   Neer:   positive   Thomson:   negative   Strength:   5/5 Supraspinatus, External rotation    Bilateral shoulders   Drop-arm test:   negative   Belly-press test:   negative   Bear-hug test:   negative   Speed's test:   not tested   Bicipital groove tenderness:  not tested   Theodore's test:   not tested   Cross-body adduction test:   negative    AC joint tenderness:   negative     There are no skin lesions, cellulitis, or extreme edema in the upper extremities. Sensation is grossly intact to light touch bilaterally upper extremity. The patient has warm and well-perfused Bilateral upper extremities with brisk capillary refill. Imaging   Left Shoulder X-Ray: 3 view x-rays of the shoulder including AP, scapular Y, and axillary    obtained and reviewed  AC Joint: narrowing mild  Glenohumeral joint: no abnormalities noted  Elevation humeral head: absent    Left Shoulder MR arthrogram: ordered, but not yet obtained      Assessment:      Left shoulder instability  Bipolar disorder      Plan:      Natural history and expected course discussed. Questions answered. MRI of left shoulder to evaluate for labral tear. Ice as needed. Discussed safe zone of arm positioning. Follow-up after MRI. Meño Alvarez. Latha Vides MD  Orthopaedic Surgery and Sports Medicine     Disclaimer: This note was generated with use of a verbal recognition program and an attempt was made to check for errors.   It is possible that there are still dictated errors within this office note. If so, please bring any significant errors to my attention for an addendum. All efforts were made to ensure that this office note is accurate.

## 2022-04-11 ENCOUNTER — OFFICE VISIT (OUTPATIENT)
Dept: PSYCHIATRY | Age: 22
End: 2022-04-11
Payer: COMMERCIAL

## 2022-04-11 DIAGNOSIS — F43.10 PTSD (POST-TRAUMATIC STRESS DISORDER): ICD-10-CM

## 2022-04-11 DIAGNOSIS — F31.62 BIPOLAR DISORDER, CURRENT EPISODE MIXED, MODERATE (HCC): Primary | ICD-10-CM

## 2022-04-11 PROCEDURE — 1036F TOBACCO NON-USER: CPT | Performed by: STUDENT IN AN ORGANIZED HEALTH CARE EDUCATION/TRAINING PROGRAM

## 2022-04-11 PROCEDURE — G8420 CALC BMI NORM PARAMETERS: HCPCS | Performed by: STUDENT IN AN ORGANIZED HEALTH CARE EDUCATION/TRAINING PROGRAM

## 2022-04-11 PROCEDURE — G8428 CUR MEDS NOT DOCUMENT: HCPCS | Performed by: STUDENT IN AN ORGANIZED HEALTH CARE EDUCATION/TRAINING PROGRAM

## 2022-04-11 PROCEDURE — 99214 OFFICE O/P EST MOD 30 MIN: CPT | Performed by: STUDENT IN AN ORGANIZED HEALTH CARE EDUCATION/TRAINING PROGRAM

## 2022-04-11 RX ORDER — LITHIUM CARBONATE 300 MG
600 TABLET ORAL DAILY
Qty: 60 TABLET | Refills: 2 | Status: SHIPPED | OUTPATIENT
Start: 2022-04-11 | End: 2022-05-22 | Stop reason: SDUPTHER

## 2022-04-11 ASSESSMENT — PATIENT HEALTH QUESTIONNAIRE - PHQ9
2. FEELING DOWN, DEPRESSED OR HOPELESS: 1
SUM OF ALL RESPONSES TO PHQ QUESTIONS 1-9: 9
SUM OF ALL RESPONSES TO PHQ QUESTIONS 1-9: 9
3. TROUBLE FALLING OR STAYING ASLEEP: 1
5. POOR APPETITE OR OVEREATING: 0
SUM OF ALL RESPONSES TO PHQ9 QUESTIONS 1 & 2: 2
4. FEELING TIRED OR HAVING LITTLE ENERGY: 1
1. LITTLE INTEREST OR PLEASURE IN DOING THINGS: 1
6. FEELING BAD ABOUT YOURSELF - OR THAT YOU ARE A FAILURE OR HAVE LET YOURSELF OR YOUR FAMILY DOWN: 1
9. THOUGHTS THAT YOU WOULD BE BETTER OFF DEAD, OR OF HURTING YOURSELF: 0
8. MOVING OR SPEAKING SO SLOWLY THAT OTHER PEOPLE COULD HAVE NOTICED. OR THE OPPOSITE, BEING SO FIGETY OR RESTLESS THAT YOU HAVE BEEN MOVING AROUND A LOT MORE THAN USUAL: 2
SUM OF ALL RESPONSES TO PHQ QUESTIONS 1-9: 9
SUM OF ALL RESPONSES TO PHQ QUESTIONS 1-9: 9
7. TROUBLE CONCENTRATING ON THINGS, SUCH AS READING THE NEWSPAPER OR WATCHING TELEVISION: 2

## 2022-04-11 ASSESSMENT — ANXIETY QUESTIONNAIRES
1. FEELING NERVOUS, ANXIOUS, OR ON EDGE: 0
6. BECOMING EASILY ANNOYED OR IRRITABLE: 1
GAD7 TOTAL SCORE: 4
3. WORRYING TOO MUCH ABOUT DIFFERENT THINGS: 1
5. BEING SO RESTLESS THAT IT IS HARD TO SIT STILL: 0
4. TROUBLE RELAXING: 1
2. NOT BEING ABLE TO STOP OR CONTROL WORRYING: 0
7. FEELING AFRAID AS IF SOMETHING AWFUL MIGHT HAPPEN: 1

## 2022-04-11 NOTE — PROGRESS NOTES
every night without issue, though there were a couple nights where she didn't sleep as well because she \"was at my mom's house and I forgot the medication\". Apparently she was showing enough symptoms that her mom asked her what was going on, to which the patient indicated that she had not taken her medication in a couple days. Her mother apparently strongly encouraged her to resume adherence to the regimen, which the patient did. On a positive note, the patient indicated that she had joined a [employer] about 3 weeks prior to the office visit and that she was enjoying herself significantly in it. 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 Patient noted that the training regimen and game schedules have been helpful in keeping her focused and on track with things. Her studies are also apparently going well. Patient denied any SI/HI/AVH. AVH have notably been markedly decreased if not gone with the onset of the lithium. ROS:  Review of Systems   Constitutional: Negative. HENT: Negative. Eyes: Negative. Respiratory: Negative. Cardiovascular: Negative. Gastrointestinal: Negative. Endocrine: Negative. Genitourinary: Negative. Musculoskeletal:        Positive shoulder pain   Skin: Negative. Allergic/Immunologic: Negative. Neurological: Negative. Hematological: Negative. Psychiatric/Behavioral: Positive for decreased concentration and dysphoric mood.         Brief Medical Hx:   Patient Active Problem List   Diagnosis    PTSD (post-traumatic stress disorder)    Bipolar disorder, current episode mixed, moderate (HCC)    Social anxiety disorder        Brief Psych Hx:  Hosp: Denied  Diagnoses: Major depression  Med trials: Fluoxetine, trazodone, bupropion, sertraline  Outpt: History of multiple therapists however none currently  NSSI: Burning (lighter/pan)  Suicide Attempts: Denied    O:  Wt Readings from Last 3 Encounters:   04/12/22 130 lb 3.2 oz (59.1 kg) (55 %, Z= 0.11)*   04/07/22 135 lb 3.2 oz (61.3 kg) (63 %, Z= 0.33)*   04/04/22 129 lb 9.6 oz (58.8 kg) (54 %, Z= 0.09)*     * Growth percentiles are based on [facility] (Girls, 2-20 Years) data. Vitals:    04/12/22 0025   BP: 106/66   Pulse: 66   Resp: 12   Weight: 130 lb 3.2 oz (59.1 kg)       Mental Status Exam:   Appearance:    Appropriately dressed  Motor: No abnormal movements, tics or mannerisms.   Eye Contact: Good  Speech:    Appropriate rate and rhythm, slightly stronger than previous especially when talking about rugby  Language:   Appropriate diction  Mood/Affect: \"I feel okay\"/mildly brighter affect from previous  Thought Process:   Linear, logical  Thought Content:    Topicappropriate, no SI/HI voiced  Hallucinations:   Denied, not seem to be responding to internal stimuli  Associations:   Intact  Attention/Concentration:   Intact  Orientation:    Alert and oriented x4  Memory:   Intact  Fund of Knowledge:    Appropriate for age and education  Insight/Judgement:   Intact/intact      MOHAN-7 SCREENING 4/11/2022 3/9/2022   Feeling nervous, anxious, or on edge Not at all More than half the days   Not being able to stop or control worrying Not at all More than half the days   Worrying too much about different things Several days More than half the days   Trouble relaxing Several days Nearly every day   Being so restless that it is hard to sit still Not at all Nearly every day   Becoming easily annoyed or irritable Several days Nearly every day   Feeling afraid as if something awful might happen Several days More than half the days   MOHAN-7 Total Score 4 17   How difficult have these problems made it for you to do your work, take care of things at home, or get along with other people? - -     PHQ-9 Questionaire 4/11/2022 3/9/2022   Little interest or pleasure in doing things 1 2   Feeling down, depressed, or hopeless 1 1   Trouble falling or staying asleep, or sleeping too much 1 3   Feeling tired or having little energy 1 3   Poor appetite or overeating 0 2   Feeling bad about yourself - or that you are a failure or have let yourself or your family down 1 2   Trouble concentrating on things, such as reading the newspaper or watching television 2 3   Moving or speaking so slowly that other people could have noticed. Or the opposite - being so fidgety or restless that you have been moving around a lot more than usual 2 3   Thoughts that you would be better off dead, or of hurting yourself in some way 0 0   PHQ-9 Total Score 9 19   If you checked off any problems, how difficult have these problems made it for you to do your work, take care of things at home, or get along with other people?  - -        Labs:     Orders Only on 03/09/2022   Component Date Value Ref Range Status    Lithium Lvl 03/09/2022 0.3* 0.6 - 1.2 mmol/L Final    Lithium Dose Amount 03/09/2022 Unknown   Final       EKG: None on file        Tye Dunlap MD  Psychiatrist

## 2022-04-12 VITALS
BODY MASS INDEX: 22.7 KG/M2 | SYSTOLIC BLOOD PRESSURE: 106 MMHG | HEART RATE: 66 BPM | RESPIRATION RATE: 12 BRPM | WEIGHT: 130.2 LBS | DIASTOLIC BLOOD PRESSURE: 66 MMHG

## 2022-04-12 ASSESSMENT — ENCOUNTER SYMPTOMS
RESPIRATORY NEGATIVE: 1
GASTROINTESTINAL NEGATIVE: 1
ALLERGIC/IMMUNOLOGIC NEGATIVE: 1
EYES NEGATIVE: 1

## 2022-04-27 NOTE — TELEPHONE ENCOUNTER
L/M SAMI MAURICE to follow up as per our records, it appears the patient received our message and scheduled their MRI for 5/4/2022 at [facility] . However, it appears a follow up appointment has not been scheduled yet. An appointment has been scheduled for 5/10/2022 to review the images and treatment plan with Dr. Ebenezer Dougherty. The patient was asked to call the office to reschedule if this appointment is inconvenient for them.

## 2022-05-04 ENCOUNTER — HOSPITAL ENCOUNTER (OUTPATIENT)
Dept: GENERAL RADIOLOGY | Age: 22
Discharge: HOME | End: 2022-05-04
Payer: COMMERCIAL

## 2022-05-04 ENCOUNTER — HOSPITAL ENCOUNTER (OUTPATIENT)
Dept: MRI IMAGING | Age: 22
Discharge: HOME | End: 2022-05-04
Payer: COMMERCIAL

## 2022-05-04 DIAGNOSIS — M25.511 RIGHT SHOULDER PAIN, UNSPECIFIED CHRONICITY: ICD-10-CM

## 2022-05-04 PROCEDURE — A9579 GAD-BASE MR CONTRAST NOS,1ML: HCPCS

## 2022-05-04 PROCEDURE — 6360000004 HC RX CONTRAST MEDICATION

## 2022-05-04 PROCEDURE — 73222 MRI JOINT UPR EXTREM W/DYE: CPT

## 2022-05-04 PROCEDURE — 2500000003 HC RX 250 WO HCPCS

## 2022-05-04 PROCEDURE — 73040 CONTRAST X-RAY OF SHOULDER: CPT

## 2022-05-04 PROCEDURE — A4216 STERILE WATER/SALINE, 10 ML: HCPCS

## 2022-05-04 PROCEDURE — 2580000003 HC RX 258

## 2022-05-04 RX ORDER — SODIUM CHLORIDE 9 MG/ML
INJECTION INTRAVENOUS
Status: COMPLETED
Start: 2022-05-04 | End: 2022-05-04

## 2022-05-04 RX ORDER — LIDOCAINE HYDROCHLORIDE 10 MG/ML
INJECTION, SOLUTION EPIDURAL; INFILTRATION; INTRACAUDAL; PERINEURAL
Status: COMPLETED
Start: 2022-05-04 | End: 2022-05-04

## 2022-05-04 RX ADMIN — LIDOCAINE HYDROCHLORIDE 5 ML: 10 INJECTION, SOLUTION EPIDURAL; INFILTRATION; INTRACAUDAL; PERINEURAL at 09:56

## 2022-05-04 RX ADMIN — IOPAMIDOL 10 ML: 408 INJECTION, SOLUTION INTRATHECAL at 09:59

## 2022-05-04 RX ADMIN — GADOTERIDOL 0.2 ML: 279.3 INJECTION, SOLUTION INTRAVENOUS at 10:00

## 2022-05-04 RX ADMIN — SODIUM CHLORIDE 10 ML: 9 INJECTION INTRAMUSCULAR; INTRAVENOUS; SUBCUTANEOUS at 09:59

## 2022-05-16 ENCOUNTER — TELEPHONE (OUTPATIENT)
Dept: INTERNAL MEDICINE CLINIC | Age: 22
End: 2022-05-16

## 2022-05-16 DIAGNOSIS — H93.8X2 EAR FULLNESS, LEFT: Primary | ICD-10-CM

## 2022-05-16 NOTE — TELEPHONE ENCOUNTER
Patient called stating that her left ear feel really full and stopped up. She says she cannot balance it out, she says it feels full all the time but has gotten worse after she went [place] diving Saturday. She says she gutierrez snot have any other associated symptoms.  And would like suggestions from the

## 2022-05-16 NOTE — TELEPHONE ENCOUNTER
Patient has tried flonase, lying ear on left side, and insufflation.  Will refer to ENT Palliative Care Infectious Disease Infectious Disease Pulmonology Critical Care Intervent Radiology Rehab Medicine Surgery Heme/Onc Nephrology

## 2022-05-18 NOTE — TELEPHONE ENCOUNTER
Patient is requesting a refill of hydrOXYzine (ATARAX) 25 MG tablet to be sent to [facility] [id number] [place] [phone] - [phone]     Patient also requested refills of     OLANZapine (ZYPREXA) 10 MG tablet last filled 3/10/22 w/2 refills  and lithium 300 MG tablet last filled 4/11/22 w/2 refills    I attempted to call patient to inform her she should still have refills remaining on these two medication. LVM for patient to call back.

## 2022-05-23 RX ORDER — OLANZAPINE 10 MG/1
10 TABLET ORAL NIGHTLY
Qty: 30 TABLET | Refills: 2 | Status: SHIPPED | OUTPATIENT
Start: 2022-05-23 | End: 2022-06-22 | Stop reason: SDUPTHER

## 2022-05-23 RX ORDER — LITHIUM CARBONATE 300 MG
600 TABLET ORAL DAILY
Qty: 60 TABLET | Refills: 2 | Status: SHIPPED | OUTPATIENT
Start: 2022-05-23 | End: 2022-06-22 | Stop reason: SDUPTHER

## 2022-06-07 NOTE — PROGRESS NOTES
PSYCHIATRY PROGRESS NOTE    Buzz Smith  2000  06/08/22  Face to Face time: 30 minutes  PCP: Carla Scruggs MD    CC:   Chief Complaint   Patient presents with    Follow-up     Patient is a 47-year-old female with significant past medical history of spondylosis and choreiform movements who presents to the outpatient psychiatric clinic today for evaluation of depression, anxiety, and manic behaviors. A:  Patient presentation today appears indicative of ongoing stability of her underlying mood disorder with the current medication regimen. The previous discussion had about scuba diving does appear to be a longstanding desire of the patient as opposed to an impulsivity that would be more associated with manic moods. There is no evidence today of manic ideations or concerns. Diagnosis:  Bipolar 1 disorder, current episode moderate mixed symptoms  PTSD  Social anxiety disorder  Cannabis use disorder    P:   1. We will plan to continue the patient on her current medication regimen of lithium 600 mg daily and olanzapine 10 mg nightly for control of her bipolar 1 disorder. 2.  Patient was advised that she is cleared at this time to potentially go scuba diving. She was advised on days that she may go diving that she should probably take her lithium after diving as well as maintain adequate hydration during the course of the dive day    Medication Monitoring:    - PDMP reviewed: No interval change    Follow-up: 8 weeks    Safety: Pt was counseled on the potential for increased suicidal ideations and advised on potential options for dealing with these including hotlines, calling the office, or going to the nearest emergency room. __________________________________________________________________________    S:   Patient endorsed that she has been generally doing well on the medication. She says that she is sleeping through the night, though she may be excessively sleeping at this time.   She identified that she is sleeping approximately 12 hours nightly and wakes up feeling somewhat fatigued after that. That being said, the patient identifies that the medications have been able to control her moods, has not had significant fluctuations that have caused her problems. She is very much looking forward to potentially getting a scuba certification, states that this was in reference to a longstanding desire of hers to get scuba trained. Apparently her father and her brother both have scuba certifications, and the patient was initially planning at the end of her senior year of high school to go on a dive trip in [facility], though [facility] ended up disrupting this. Patient does endorse that she has ongoing difficulties at times with focus and concentration. She noted that her weight has gone up but also that she was attempting to physically bulk up at this time. The patient did endorse that she was having some mild speech changes, stating that at times her words can be a little bit difficult to articulate. Patient denied any SI/HI/AVH on evaluation today. ROS:  Review of Systems   Constitutional: Negative. HENT: Negative. Eyes: Negative. Respiratory: Negative. Cardiovascular: Negative. Gastrointestinal: Negative. Endocrine: Negative. Genitourinary: Negative. Musculoskeletal: Negative. Skin: Negative. Allergic/Immunologic: Negative. Neurological: Negative. Hematological: Negative. Psychiatric/Behavioral: Positive for decreased concentration.         Mild hypersomnolence        Brief Medical Hx:   Patient Active Problem List   Diagnosis    PTSD (post-traumatic stress disorder)    Bipolar disorder, current episode mixed, moderate (HCC)    Social anxiety disorder        Brief Psych Hx:  Hosp: Denied  Diagnoses: Major depression  Med trials: Fluoxetine, trazodone, bupropion, sertraline  Outpt: History of multiple therapists however none currently  NSSI: Burning (lighter/pan)  Suicide Attempts: Denied    O:  Wt Readings from Last 3 Encounters:   06/08/22 140 lb (63.5 kg) (69 %, Z= 0.50)*   04/12/22 130 lb 3.2 oz (59.1 kg) (55 %, Z= 0.11)*   04/07/22 135 lb 3.2 oz (61.3 kg) (63 %, Z= 0.33)*     * Growth percentiles are based on [facility] (Girls, 2-20 Years) data. Vitals:    06/08/22 0914   BP: 113/72   Site: Right Upper Arm   Position: Sitting   Cuff Size: Medium Adult   Weight: 140 lb (63.5 kg)       Mental Status Exam:   Appearance:    Appropriately dressed  Motor: No abnormal movements, tics or mannerisms. Eye Contact: Good  Speech:    Generally good, no evidence of slurring  Language:   Appropriate diction  Mood/Affect:  \" I am okay\"/mild blunting apparent  Thought Process:   Linear, logical  Thought Content:    Topicappropriate, no SI/HI  Hallucinations:   Denied, no evidence of responses to internal stimuli  Associations:   Intact  Attention/Concentration:   Intact  Orientation:    Alert and oriented x4  Memory:   Intact  Fund of Knowledge:    Appropriate for age and education  Insight/Judgement:   Intact/intact      MOHAN-7 SCREENING 6/8/2022 4/11/2022   Feeling nervous, anxious, or on edge Not at all Not at all   Not being able to stop or control worrying Several days Not at all   Worrying too much about different things Not at all Several days   Trouble relaxing Not at all Several days   Being so restless that it is hard to sit still Several days Not at all   Becoming easily annoyed or irritable Several days Several days   Feeling afraid as if something awful might happen More than half the days Several days   MOHAN-7 Total Score 5 4   How difficult have these problems made it for you to do your work, take care of things at home, or get along with other people?  Somewhat difficult -     PHQ-9 Questionaire 6/8/2022 4/11/2022   Little interest or pleasure in doing things 1 1   Feeling down, depressed, or hopeless 1 1   Trouble falling or staying asleep, or sleeping too much 1 1   Feeling tired or having little energy 1 1   Poor appetite or overeating 0 0   Feeling bad about yourself - or that you are a failure or have let yourself or your family down 1 1   Trouble concentrating on things, such as reading the newspaper or watching television 3 2   Moving or speaking so slowly that other people could have noticed. Or the opposite - being so fidgety or restless that you have been moving around a lot more than usual 0 2   Thoughts that you would be better off dead, or of hurting yourself in some way 0 0   PHQ-9 Total Score 8 9   If you checked off any problems, how difficult have these problems made it for you to do your work, take care of things at home, or get along with other people? 1 -        AIMS Scale  Facial and Oral Movements(AIMS)  Muscles of Facial Expression: None, normal  Lips and Perioral Area: None, normal  Jaw: None, normal  Tongue: Minimal  Extremity Movements(AIMS)  Upper (arms, wrists, hands, fingers): None, normal  Lower (legs, knees, ankles, toes): None, normal  Trunk Movements(AIMS)  Neck, shoulders, hips: None, normal  Overall Severity(AIMS)  Severity of abnormal movements (highest score from 1-7): 1  Incapacitation due to abnormal movements: None, normal  Patient's awareness of abnormal movements (rate only patient's report):  No Awareness  Total of items 1-7: 1  Copy above value to this cell: 1  Dental Status(AIMS)  Current problems with teeth and/or dentures?: No  Does patient usually wear dentures?: No     Labs:     Orders Only on 03/09/2022   Component Date Value Ref Range Status    Lithium Lvl 03/09/2022 0.3* 0.6 - 1.2 mmol/L Final    Lithium Dose Amount 03/09/2022 Unknown   Final         EKG: None on file        Monserrat Lynch MD  Psychiatrist

## 2022-06-08 ENCOUNTER — OFFICE VISIT (OUTPATIENT)
Dept: PSYCHIATRY | Age: 22
End: 2022-06-08
Payer: COMMERCIAL

## 2022-06-08 VITALS — WEIGHT: 140 LBS | BODY MASS INDEX: 24.41 KG/M2 | DIASTOLIC BLOOD PRESSURE: 72 MMHG | SYSTOLIC BLOOD PRESSURE: 113 MMHG

## 2022-06-08 DIAGNOSIS — F31.62 BIPOLAR DISORDER, CURRENT EPISODE MIXED, MODERATE (HCC): Primary | ICD-10-CM

## 2022-06-08 DIAGNOSIS — F43.10 PTSD (POST-TRAUMATIC STRESS DISORDER): ICD-10-CM

## 2022-06-08 DIAGNOSIS — F40.10 SOCIAL ANXIETY DISORDER: ICD-10-CM

## 2022-06-08 PROCEDURE — 1036F TOBACCO NON-USER: CPT | Performed by: STUDENT IN AN ORGANIZED HEALTH CARE EDUCATION/TRAINING PROGRAM

## 2022-06-08 PROCEDURE — G8420 CALC BMI NORM PARAMETERS: HCPCS | Performed by: STUDENT IN AN ORGANIZED HEALTH CARE EDUCATION/TRAINING PROGRAM

## 2022-06-08 PROCEDURE — 99214 OFFICE O/P EST MOD 30 MIN: CPT | Performed by: STUDENT IN AN ORGANIZED HEALTH CARE EDUCATION/TRAINING PROGRAM

## 2022-06-08 PROCEDURE — G8427 DOCREV CUR MEDS BY ELIG CLIN: HCPCS | Performed by: STUDENT IN AN ORGANIZED HEALTH CARE EDUCATION/TRAINING PROGRAM

## 2022-06-08 ASSESSMENT — PATIENT HEALTH QUESTIONNAIRE - PHQ9
SUM OF ALL RESPONSES TO PHQ QUESTIONS 1-9: 8
SUM OF ALL RESPONSES TO PHQ QUESTIONS 1-9: 8
7. TROUBLE CONCENTRATING ON THINGS, SUCH AS READING THE NEWSPAPER OR WATCHING TELEVISION: 3
1. LITTLE INTEREST OR PLEASURE IN DOING THINGS: 1
5. POOR APPETITE OR OVEREATING: 0
3. TROUBLE FALLING OR STAYING ASLEEP: 1
10. IF YOU CHECKED OFF ANY PROBLEMS, HOW DIFFICULT HAVE THESE PROBLEMS MADE IT FOR YOU TO DO YOUR WORK, TAKE CARE OF THINGS AT HOME, OR GET ALONG WITH OTHER PEOPLE: 1
6. FEELING BAD ABOUT YOURSELF - OR THAT YOU ARE A FAILURE OR HAVE LET YOURSELF OR YOUR FAMILY DOWN: 1
SUM OF ALL RESPONSES TO PHQ QUESTIONS 1-9: 8
4. FEELING TIRED OR HAVING LITTLE ENERGY: 1
2. FEELING DOWN, DEPRESSED OR HOPELESS: 1
SUM OF ALL RESPONSES TO PHQ QUESTIONS 1-9: 8
8. MOVING OR SPEAKING SO SLOWLY THAT OTHER PEOPLE COULD HAVE NOTICED. OR THE OPPOSITE, BEING SO FIGETY OR RESTLESS THAT YOU HAVE BEEN MOVING AROUND A LOT MORE THAN USUAL: 0
SUM OF ALL RESPONSES TO PHQ9 QUESTIONS 1 & 2: 2
9. THOUGHTS THAT YOU WOULD BE BETTER OFF DEAD, OR OF HURTING YOURSELF: 0

## 2022-06-08 ASSESSMENT — ANXIETY QUESTIONNAIRES
6. BECOMING EASILY ANNOYED OR IRRITABLE: 1
4. TROUBLE RELAXING: 0
5. BEING SO RESTLESS THAT IT IS HARD TO SIT STILL: 1
GAD7 TOTAL SCORE: 5
7. FEELING AFRAID AS IF SOMETHING AWFUL MIGHT HAPPEN: 2
3. WORRYING TOO MUCH ABOUT DIFFERENT THINGS: 0
IF YOU CHECKED OFF ANY PROBLEMS ON THIS QUESTIONNAIRE, HOW DIFFICULT HAVE THESE PROBLEMS MADE IT FOR YOU TO DO YOUR WORK, TAKE CARE OF THINGS AT HOME, OR GET ALONG WITH OTHER PEOPLE: SOMEWHAT DIFFICULT
2. NOT BEING ABLE TO STOP OR CONTROL WORRYING: 1
1. FEELING NERVOUS, ANXIOUS, OR ON EDGE: 0

## 2022-06-09 ENCOUNTER — OFFICE VISIT (OUTPATIENT)
Dept: ENT CLINIC | Age: 22
End: 2022-06-09
Payer: COMMERCIAL

## 2022-06-09 VITALS
WEIGHT: 137 LBS | BODY MASS INDEX: 23.39 KG/M2 | HEART RATE: 97 BPM | DIASTOLIC BLOOD PRESSURE: 74 MMHG | SYSTOLIC BLOOD PRESSURE: 107 MMHG | HEIGHT: 64 IN

## 2022-06-09 DIAGNOSIS — H61.22 IMPACTED CERUMEN OF LEFT EAR: Primary | ICD-10-CM

## 2022-06-09 PROCEDURE — 69210 REMOVE IMPACTED EAR WAX UNI: CPT | Performed by: OTOLARYNGOLOGY

## 2022-06-09 NOTE — PROGRESS NOTES
Here for cerumen removal.     Cerumen  Pre operative diagnosis: Cerumen impaction on the left  Post operative diagnosis: Same  Procedure: on the left cerumenectomy    After consent an operating microscope was utilized to visualize the external auditory canals bilaterally. Cerumen was removed with curettes and davidson suctions on the the left side. The tympanic membrane is intact. No fluid visualized in the middle ear. No complications. I attest I performed the entire procedure myself. Follow up as needed.

## 2022-06-23 RX ORDER — LITHIUM CARBONATE 300 MG
600 TABLET ORAL DAILY
Qty: 60 TABLET | Refills: 2 | Status: SHIPPED | OUTPATIENT
Start: 2022-06-23 | End: 2022-07-25 | Stop reason: SDUPTHER

## 2022-06-23 RX ORDER — OLANZAPINE 10 MG/1
10 TABLET ORAL NIGHTLY
Qty: 30 TABLET | Refills: 2 | Status: SHIPPED | OUTPATIENT
Start: 2022-06-23 | End: 2022-07-24 | Stop reason: SDUPTHER

## 2022-07-21 ENCOUNTER — OFFICE VISIT (OUTPATIENT)
Dept: ORTHOPEDIC SURGERY | Age: 22
End: 2022-07-21
Payer: COMMERCIAL

## 2022-07-21 VITALS — WEIGHT: 140.6 LBS | BODY MASS INDEX: 24.01 KG/M2 | RESPIRATION RATE: 14 BRPM | HEIGHT: 64 IN

## 2022-07-21 DIAGNOSIS — M75.81 RIGHT ROTATOR CUFF TENDINITIS: Primary | ICD-10-CM

## 2022-07-21 PROCEDURE — 99213 OFFICE O/P EST LOW 20 MIN: CPT | Performed by: ORTHOPAEDIC SURGERY

## 2022-07-21 NOTE — PROGRESS NOTES
CHIEF COMPLAINT: Right shoulder pain    History:    Sowmya Hernandez is a 25 y.o. left handed female here for right shoulder follow-up    Initial history: referred by Lexa Forman MD for Sports Medicine consultation  for evaluation and treatment of Right shoulder pain. This is evaluated as a personal injury. The pain began 5 days ago. Pain is rated as a 5/10. There was an injury. She was playing rugby and feel like she had a anterior shoulder dislocation. Her arm was yanked backwards. This happened again during the same game when she was kneed. She states it self reduced both times. She states she has a history of shoulder dislocation 3 months ago while she was lifting weights in her arm went backwards. She again said that self reduce. She did not seek medical attention. She states she wore a brace. Pain is located laterally and deep in the shoulder. She also notes some neck pain now. No numbness and tingling currently in her hand. She did have that initially after the recent injury. The patient has not had PT. The patient has not had an injection. The patient has not tried NSAIDs because she is on lithium. The patient has tried ice with relief. 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 She rates pain 2-3/10. She points to pain being located deep in her shoulder, around her axilla.       Past Medical History:   Diagnosis Date    Acne     Anxiety and depression     Arthritis of right hip     Chorea     saw [facility] neurology    Dyslexia     has IEP    Family history of breast cancer     Low back pain     sp esiBilateral L5 spondylolysis with grade 1 spondylolisthesis    MTHFR mutation     heterozygous    Oligomenorrhea     probable pcos       Past Surgical History:   Procedure Laterality Date    TYMPANOSTOMY TUBE PLACEMENT         Current Outpatient Medications on File Prior to Visit   Medication Sig Dispense Refill OLANZapine (ZYPREXA) 10 MG tablet Take 1 tablet by mouth nightly 30 tablet 2    lithium 300 MG tablet Take 2 tablets by mouth daily 60 tablet 2    albuterol sulfate  (90 Base) MCG/ACT inhaler Inhale 2 puffs into the lungs every 6 hours as needed       No current facility-administered medications on file prior to visit. Allergies   Allergen Reactions    Zithromax [Azithromycin] Anaphylaxis and Rash    Prozac [Fluoxetine]      Suicidal thoughts       Social History     Socioeconomic History    Marital status: Single     Spouse name: Not on file    Number of children: 0    Years of education: 13    Highest education level: Some college, no degree   Occupational History    Occupation:  - environmental studies     Employer: STUDENT   Tobacco Use    Smoking status: Never    Smokeless tobacco: Never   Substance and Sexual Activity    Alcohol use: Yes     Comment: Social wine drinking    Drug use: Yes     Types: Marijuana (Weed)     Comment: Maximum during freshman year of college was 5 g daily. Currently 1 delta 8 every 3 weeks. Trialed cocaine 1 time, mushrooms 3 times    Sexual activity: Not Currently     Partners: Female     Comment: Identifies as nonbinary. Avoidance behaviors towards sexual contact   Other Topics Concern    Not on file   Social History Narrative    Patient is currently enrolled at , grades are okay with a grade point average of 2.8 (\"pretty good\" by her standards). Patient lives with a male roommate who she is platonic friends with. She came out as nonbinary to her parents a couple of weeks ago. Previously worked at [employer] though not currently, left because employment was too far from [employer]. High school grades were as follows: Freshman year C's, sophomore year B's, douglas year A's, senior year A's and B's. No guns at home, no legal issues currently, no  service for the patient     Social Determinants of Health     Financial Resource Strain: Low Risk     Difficulty of Paying Living Expenses: Not hard at all   Food Insecurity: No Food Insecurity    Worried About Running Out of Food in the Last Year: Never true    Ran Out of Food in the Last Year: Never true   Transportation Needs: Not on file   Physical Activity: Not on file   Stress: Not on file   Social Connections: Not on file   Intimate Partner Violence: Not on file   Housing Stability: Not on file       Family History   Problem Relation Age of Onset    Depression Mother         Undiagnosed    Depression Father         Seasonal, Sertraline    Heart Disease Paternal Grandfather     Suicide Paternal Cousin        Review of Systems:   I have reviewed the clinically relevant past medical history, medications, allergies, family history, social history, and 13 point Review of Systems from the patient's recent history form & documented any details relevant to today's presenting complaints in the history above. The patient's self-reported past medical history, medications, allergies, family history, social history, and Review of Systems form from 4/7/22 have been scanned into the chart under the \"Media\" tab. Physical Examination:      Vital signs:  Resp 14   Ht 5' 4\" (1.626 m)   Wt 140 lb 9.6 oz (63.8 kg)   BMI 24.13 kg/m²     General:   alert, appears stated age, cooperative and no distress   R     Imaging   Left Shoulder X-Ray:   AC Joint: narrowing mild  Glenohumeral joint: no abnormalities noted  Elevation humeral head: absent    Left Shoulder MR arthrogram 5/4/22:   1. Low-grade partial-thickness articular-surface tearing of supraspinatus and   infraspinatus between critical zone and footplate. Mild underlying   supraspinatus and infraspinatus tendinosis. 2. No discrete labral tear or paralabral cyst formation         Assessment:      Left shoulder rotator cuff tendinitis  Bipolar disorder      Plan:      Natural history and expected course discussed. Questions answered.   We reviewed her MRI images and discussed the findings. There is no evidence to suggest prior shoulder dislocation    PT referral provided    Ice as needed. NSAIDs as needed. I suggest that she check with her psychiatrist about whether she can take this, as she states that she is on lithium and cannot. Follow-up in 2 months. Mandi Miller. Agustín Daniel MD  Orthopaedic Surgery and Sports Medicine     Disclaimer: This note was generated with use of a verbal recognition program and an attempt was made to check for errors. It is possible that there are still dictated errors within this office note. If so, please bring any significant errors to my attention for an addendum. All efforts were made to ensure that this office note is accurate.

## 2022-07-25 RX ORDER — OLANZAPINE 10 MG/1
15 TABLET ORAL NIGHTLY
Qty: 45 TABLET | Refills: 2 | Status: SHIPPED | OUTPATIENT
Start: 2022-07-25 | End: 2022-08-08

## 2022-07-25 NOTE — TELEPHONE ENCOUNTER
Medication:   Requested Prescriptions     Pending Prescriptions Disp Refills    OLANZapine (ZYPREXA) 10 MG tablet 30 tablet 2     Sig: Take 1 tablet by mouth nightly        Last Filled:      Patient Phone Number: [phone] (home)     Last appt: 6/8/2022   Next appt: 8/8/2022    Last OARRS: No flowsheet data found.

## 2022-07-26 RX ORDER — LITHIUM CARBONATE 300 MG
600 TABLET ORAL DAILY
Qty: 60 TABLET | Refills: 2 | Status: SHIPPED | OUTPATIENT
Start: 2022-07-26 | End: 2022-08-08

## 2022-07-26 NOTE — TELEPHONE ENCOUNTER
Medication:   Requested Prescriptions     Pending Prescriptions Disp Refills    lithium 300 MG tablet 60 tablet 2     Sig: Take 2 tablets by mouth in the morning. Patient Phone Number: [phone] (home)     Last appt: 6/8/2022   Next appt: 8/8/2022    Last OARRS: No flowsheet data found.

## 2022-08-08 ENCOUNTER — OFFICE VISIT (OUTPATIENT)
Dept: PSYCHIATRY | Age: 22
End: 2022-08-08
Payer: COMMERCIAL

## 2022-08-08 VITALS
DIASTOLIC BLOOD PRESSURE: 78 MMHG | BODY MASS INDEX: 23.34 KG/M2 | WEIGHT: 136 LBS | SYSTOLIC BLOOD PRESSURE: 110 MMHG | HEART RATE: 103 BPM

## 2022-08-08 DIAGNOSIS — F43.10 PTSD (POST-TRAUMATIC STRESS DISORDER): ICD-10-CM

## 2022-08-08 DIAGNOSIS — F31.62 BIPOLAR DISORDER, CURRENT EPISODE MIXED, MODERATE (HCC): Primary | ICD-10-CM

## 2022-08-08 DIAGNOSIS — F40.10 SOCIAL ANXIETY DISORDER: ICD-10-CM

## 2022-08-08 PROCEDURE — 99214 OFFICE O/P EST MOD 30 MIN: CPT | Performed by: STUDENT IN AN ORGANIZED HEALTH CARE EDUCATION/TRAINING PROGRAM

## 2022-08-08 PROCEDURE — G8420 CALC BMI NORM PARAMETERS: HCPCS | Performed by: STUDENT IN AN ORGANIZED HEALTH CARE EDUCATION/TRAINING PROGRAM

## 2022-08-08 PROCEDURE — 1036F TOBACCO NON-USER: CPT | Performed by: STUDENT IN AN ORGANIZED HEALTH CARE EDUCATION/TRAINING PROGRAM

## 2022-08-08 PROCEDURE — G8427 DOCREV CUR MEDS BY ELIG CLIN: HCPCS | Performed by: STUDENT IN AN ORGANIZED HEALTH CARE EDUCATION/TRAINING PROGRAM

## 2022-08-08 RX ORDER — OLANZAPINE 10 MG/1
10 TABLET ORAL NIGHTLY
Qty: 30 TABLET | Refills: 2 | Status: SHIPPED | OUTPATIENT
Start: 2022-08-08 | End: 2022-09-02 | Stop reason: SDUPTHER

## 2022-08-08 RX ORDER — OLANZAPINE 5 MG/1
2.5 TABLET ORAL NIGHTLY
Qty: 30 TABLET | Refills: 2 | Status: SHIPPED | OUTPATIENT
Start: 2022-08-08 | End: 2022-09-06 | Stop reason: SDUPTHER

## 2022-08-08 RX ORDER — LITHIUM CARBONATE 300 MG
TABLET ORAL
Qty: 90 TABLET | Refills: 2 | Status: SHIPPED | OUTPATIENT
Start: 2022-08-08 | End: 2022-08-27 | Stop reason: SDUPTHER

## 2022-08-08 ASSESSMENT — PATIENT HEALTH QUESTIONNAIRE - PHQ9
SUM OF ALL RESPONSES TO PHQ QUESTIONS 1-9: 12
5. POOR APPETITE OR OVEREATING: 1
3. TROUBLE FALLING OR STAYING ASLEEP: 2
6. FEELING BAD ABOUT YOURSELF - OR THAT YOU ARE A FAILURE OR HAVE LET YOURSELF OR YOUR FAMILY DOWN: 0
1. LITTLE INTEREST OR PLEASURE IN DOING THINGS: 1
7. TROUBLE CONCENTRATING ON THINGS, SUCH AS READING THE NEWSPAPER OR WATCHING TELEVISION: 3
SUM OF ALL RESPONSES TO PHQ9 QUESTIONS 1 & 2: 1
9. THOUGHTS THAT YOU WOULD BE BETTER OFF DEAD, OR OF HURTING YOURSELF: 0
4. FEELING TIRED OR HAVING LITTLE ENERGY: 2
SUM OF ALL RESPONSES TO PHQ QUESTIONS 1-9: 12
8. MOVING OR SPEAKING SO SLOWLY THAT OTHER PEOPLE COULD HAVE NOTICED. OR THE OPPOSITE, BEING SO FIGETY OR RESTLESS THAT YOU HAVE BEEN MOVING AROUND A LOT MORE THAN USUAL: 3
SUM OF ALL RESPONSES TO PHQ QUESTIONS 1-9: 12
2. FEELING DOWN, DEPRESSED OR HOPELESS: 0
SUM OF ALL RESPONSES TO PHQ QUESTIONS 1-9: 12
10. IF YOU CHECKED OFF ANY PROBLEMS, HOW DIFFICULT HAVE THESE PROBLEMS MADE IT FOR YOU TO DO YOUR WORK, TAKE CARE OF THINGS AT HOME, OR GET ALONG WITH OTHER PEOPLE: 2

## 2022-08-08 ASSESSMENT — ANXIETY QUESTIONNAIRES
GAD7 TOTAL SCORE: 7
7. FEELING AFRAID AS IF SOMETHING AWFUL MIGHT HAPPEN: 0
6. BECOMING EASILY ANNOYED OR IRRITABLE: 2
1. FEELING NERVOUS, ANXIOUS, OR ON EDGE: 1
4. TROUBLE RELAXING: 1
3. WORRYING TOO MUCH ABOUT DIFFERENT THINGS: 0
IF YOU CHECKED OFF ANY PROBLEMS ON THIS QUESTIONNAIRE, HOW DIFFICULT HAVE THESE PROBLEMS MADE IT FOR YOU TO DO YOUR WORK, TAKE CARE OF THINGS AT HOME, OR GET ALONG WITH OTHER PEOPLE: VERY DIFFICULT
5. BEING SO RESTLESS THAT IT IS HARD TO SIT STILL: 2
2. NOT BEING ABLE TO STOP OR CONTROL WORRYING: 1

## 2022-08-08 NOTE — PROGRESS NOTES
PSYCHIATRY PROGRESS NOTE    Rolly Powell  2000  08/08/22  Face to Face time: 30 minutes  PCP: Coty Maldonado MD    CC:   Chief Complaint   Patient presents with    Follow-up     Patient is a 73-year-old female with significant past medical history of spondylosis and choreiform movements who presents to the outpatient psychiatric clinic today for evaluation of depression, anxiety, and manic behaviors. A:  Patient's presentation is generally stable with regard to her Bipolar disorder, however there are some adverse effects with some of the medications that need adjustment. We will attempt to provide that at this time. Diagnosis:  Bipolar 1 disorder, current episode moderate mixed symptoms  PTSD  Social anxiety disorder  Cannabis use disorder    P:   We will plan to increase the patient's lithium to 600mg every morning and 300mg every evening. Patient was advised of potential adverse effects of the medications as had been described to her previously. We will plan to decrease the patient's olanzapine to 12.5mg every evening. The patient was reminded of potential manic symptoms that could break through as this medication is lowered, advised to contact in the event she has difficulty achieving consistent sleep. We will plan to obtain labs next week including bmp, lipid panel, and lithium level    Medication Monitoring:    - PDMP reviewed: No current prescriptions     Follow-up: 4 weeks    Safety: Pt was counseled on the potential for increased suicidal ideations and advised on potential options for dealing with these including hotlines, calling the office, or going to the nearest emergency room. __________________________________________________________________________    S:   The patient noted that she is generally doing well at this time.   She noted that she's looking forward to the upcoming school year where she's going to be taking courses on chemistry, ecology, professionalism, and sociology. The patient did report that she's having some difficulties with oversleeping, noting that with the higher dose of her olanzapine she's feeling exceedingly tired but still finding that she's sleeping each night. One of the things that the patient did note was that she was hoping she could study abroad in the following semester. She noted that she'd started looking into things like getting her medications in pharmacies abroad, looking at [facility] from [facility]. She will be looking into this further over the coming months and providing additional information. The patient denied any SI/HI/AVH. ROS:  Review of Systems   Constitutional:  Positive for fatigue. HENT: Negative. Eyes: Negative. Respiratory: Negative. Cardiovascular: Negative. Gastrointestinal: Negative. Endocrine: Negative. Genitourinary: Negative. Musculoskeletal: Negative. Skin: Negative. Allergic/Immunologic: Negative. Neurological: Negative. Hematological: Negative. Psychiatric/Behavioral:  Positive for sleep disturbance. Brief Medical Hx:   Patient Active Problem List   Diagnosis    PTSD (post-traumatic stress disorder)    Bipolar disorder, current episode mixed, moderate (HCC)    Social anxiety disorder        Brief Psych Hx:  Hosp: Denied  Diagnoses: Major depression  Med trials: Fluoxetine, trazodone, bupropion, sertraline  Outpt: History of multiple therapists however none currently  NSSI: Burning (lighter/pan)  Suicide Attempts: Denied    O:  Wt Readings from Last 3 Encounters:   08/08/22 136 lb (61.7 kg) (63 %, Z= 0.34)*   07/21/22 140 lb 9.6 oz (63.8 kg) (70 %, Z= 0.52)*   06/09/22 137 lb (62.1 kg) (65 %, Z= 0.39)*     * Growth percentiles are based on CDC (Girls, 2-20 Years) data.        Vitals:    08/08/22 1615   BP: 110/78   Pulse: (!) 103   Weight: 136 lb (61.7 kg)       Mental Status Exam:   Appearance:    Appropriately dressed  Motor: No abnormal movements, tics or mannerisms. Eye Contact: Good  Speech:    Appropriate rate and rhythm  Language:   Appropriate diction  Mood/Affect:  \"I'm alright but tired\"/ Generally euthymic  Thought Process:   Linear, logical  Thought Content:    Topic-appropriate, no SI/HI  Hallucinations:   Denied, not seen to be responding to IS  Associations:   Intact  Attention/Concentration:   Intact  Orientation:    Alert and oriented x4  Memory:   Intact  Fund of Knowledge:    Appropriate for age and education  Insight/Judgement:   Intact/intact      MOHAN-7 SCREENING 8/8/2022 6/8/2022   Feeling nervous, anxious, or on edge Several days Not at all   Not being able to stop or control worrying Several days Several days   Worrying too much about different things Not at all Not at all   Trouble relaxing Several days Not at all   Being so restless that it is hard to sit still More than half the days Several days   Becoming easily annoyed or irritable More than half the days Several days   Feeling afraid as if something awful might happen Not at all More than half the days   MOHAN-7 Total Score 7 5   How difficult have these problems made it for you to do your work, take care of things at home, or get along with other people? Very difficult Somewhat difficult     PHQ-9 Questionaire 8/8/2022 6/8/2022   Little interest or pleasure in doing things 1 1   Feeling down, depressed, or hopeless 0 1   Trouble falling or staying asleep, or sleeping too much 2 1   Feeling tired or having little energy 2 1   Poor appetite or overeating 1 0   Feeling bad about yourself - or that you are a failure or have let yourself or your family down 0 1   Trouble concentrating on things, such as reading the newspaper or watching television 3 3   Moving or speaking so slowly that other people could have noticed.  Or the opposite - being so fidgety or restless that you have been moving around a lot more than usual 3 0   Thoughts that you would be better off dead, or of hurting yourself in some way 0 0   PHQ-9 Total Score 12 8   If you checked off any problems, how difficult have these problems made it for you to do your work, take care of things at home, or get along with other people?  2 1      Labs:     Orders Only on 03/09/2022   Component Date Value Ref Range Status    Lithium Lvl 03/09/2022 0.3 (A) 0.6 - 1.2 mmol/L Final    Lithium Dose Amount 03/09/2022 Unknown   Final       EKG: None on file        Demi Robledo MD  Psychiatrist

## 2022-08-12 ASSESSMENT — ENCOUNTER SYMPTOMS
EYES NEGATIVE: 1
ALLERGIC/IMMUNOLOGIC NEGATIVE: 1
GASTROINTESTINAL NEGATIVE: 1
RESPIRATORY NEGATIVE: 1

## 2022-08-27 DIAGNOSIS — F31.62 BIPOLAR DISORDER, CURRENT EPISODE MIXED, MODERATE (HCC): ICD-10-CM

## 2022-08-29 DIAGNOSIS — F31.62 BIPOLAR DISORDER, CURRENT EPISODE MIXED, MODERATE (HCC): ICD-10-CM

## 2022-08-29 RX ORDER — LITHIUM CARBONATE 300 MG
TABLET ORAL
Qty: 90 TABLET | Refills: 2 | Status: SHIPPED | OUTPATIENT
Start: 2022-08-29 | End: 2022-09-30 | Stop reason: SDUPTHER

## 2022-08-30 RX ORDER — LITHIUM CARBONATE 300 MG
TABLET ORAL
Qty: 90 TABLET | Refills: 2 | OUTPATIENT
Start: 2022-08-30

## 2022-08-30 NOTE — TELEPHONE ENCOUNTER
Medication:   Requested Prescriptions     Pending Prescriptions Disp Refills    lithium 300 MG tablet 90 tablet 2     Sig: Take 1 tablet in the AM and 2 tablets every evening for a total of 900mg daily          Patient Phone Number: [phone] (home)     Last appt: 8/8/2022   Next appt: 9/8/2022    Last OARRS: No flowsheet data found.

## 2022-09-02 DIAGNOSIS — F31.62 BIPOLAR DISORDER, CURRENT EPISODE MIXED, MODERATE (HCC): ICD-10-CM

## 2022-09-06 RX ORDER — OLANZAPINE 5 MG/1
2.5 TABLET ORAL NIGHTLY
Qty: 30 TABLET | Refills: 2 | Status: SHIPPED | OUTPATIENT
Start: 2022-09-06 | End: 2022-11-16 | Stop reason: SDUPTHER

## 2022-09-06 RX ORDER — OLANZAPINE 10 MG/1
10 TABLET ORAL NIGHTLY
Qty: 30 TABLET | Refills: 2 | Status: SHIPPED | OUTPATIENT
Start: 2022-09-06 | End: 2022-10-06 | Stop reason: SDUPTHER

## 2022-09-08 ENCOUNTER — OFFICE VISIT (OUTPATIENT)
Dept: PSYCHIATRY | Age: 22
End: 2022-09-08
Payer: COMMERCIAL

## 2022-09-08 VITALS
SYSTOLIC BLOOD PRESSURE: 102 MMHG | HEART RATE: 74 BPM | WEIGHT: 141 LBS | DIASTOLIC BLOOD PRESSURE: 76 MMHG | BODY MASS INDEX: 24.2 KG/M2

## 2022-09-08 DIAGNOSIS — F40.10 SOCIAL ANXIETY DISORDER: ICD-10-CM

## 2022-09-08 DIAGNOSIS — F43.10 PTSD (POST-TRAUMATIC STRESS DISORDER): ICD-10-CM

## 2022-09-08 DIAGNOSIS — F31.62 BIPOLAR DISORDER, CURRENT EPISODE MIXED, MODERATE (HCC): Primary | ICD-10-CM

## 2022-09-08 PROCEDURE — G8427 DOCREV CUR MEDS BY ELIG CLIN: HCPCS | Performed by: STUDENT IN AN ORGANIZED HEALTH CARE EDUCATION/TRAINING PROGRAM

## 2022-09-08 PROCEDURE — 1036F TOBACCO NON-USER: CPT | Performed by: STUDENT IN AN ORGANIZED HEALTH CARE EDUCATION/TRAINING PROGRAM

## 2022-09-08 PROCEDURE — G8420 CALC BMI NORM PARAMETERS: HCPCS | Performed by: STUDENT IN AN ORGANIZED HEALTH CARE EDUCATION/TRAINING PROGRAM

## 2022-09-08 PROCEDURE — 99214 OFFICE O/P EST MOD 30 MIN: CPT | Performed by: STUDENT IN AN ORGANIZED HEALTH CARE EDUCATION/TRAINING PROGRAM

## 2022-09-08 ASSESSMENT — ANXIETY QUESTIONNAIRES
5. BEING SO RESTLESS THAT IT IS HARD TO SIT STILL: 2
7. FEELING AFRAID AS IF SOMETHING AWFUL MIGHT HAPPEN: 1
GAD7 TOTAL SCORE: 9
2. NOT BEING ABLE TO STOP OR CONTROL WORRYING: 1
6. BECOMING EASILY ANNOYED OR IRRITABLE: 1
4. TROUBLE RELAXING: 2
3. WORRYING TOO MUCH ABOUT DIFFERENT THINGS: 1
1. FEELING NERVOUS, ANXIOUS, OR ON EDGE: 1

## 2022-09-08 ASSESSMENT — PATIENT HEALTH QUESTIONNAIRE - PHQ9
SUM OF ALL RESPONSES TO PHQ QUESTIONS 1-9: 11
5. POOR APPETITE OR OVEREATING: 1
9. THOUGHTS THAT YOU WOULD BE BETTER OFF DEAD, OR OF HURTING YOURSELF: 0
2. FEELING DOWN, DEPRESSED OR HOPELESS: 1
7. TROUBLE CONCENTRATING ON THINGS, SUCH AS READING THE NEWSPAPER OR WATCHING TELEVISION: 3
SUM OF ALL RESPONSES TO PHQ QUESTIONS 1-9: 11
SUM OF ALL RESPONSES TO PHQ9 QUESTIONS 1 & 2: 2
6. FEELING BAD ABOUT YOURSELF - OR THAT YOU ARE A FAILURE OR HAVE LET YOURSELF OR YOUR FAMILY DOWN: 1
8. MOVING OR SPEAKING SO SLOWLY THAT OTHER PEOPLE COULD HAVE NOTICED. OR THE OPPOSITE, BEING SO FIGETY OR RESTLESS THAT YOU HAVE BEEN MOVING AROUND A LOT MORE THAN USUAL: 3
SUM OF ALL RESPONSES TO PHQ QUESTIONS 1-9: 11
1. LITTLE INTEREST OR PLEASURE IN DOING THINGS: 1
SUM OF ALL RESPONSES TO PHQ QUESTIONS 1-9: 11
3. TROUBLE FALLING OR STAYING ASLEEP: 1

## 2022-09-08 ASSESSMENT — ENCOUNTER SYMPTOMS
EYES NEGATIVE: 1
RESPIRATORY NEGATIVE: 1
ALLERGIC/IMMUNOLOGIC NEGATIVE: 1
GASTROINTESTINAL NEGATIVE: 1

## 2022-09-08 NOTE — PROGRESS NOTES
noting that this is even worse in the classroom setting. She identified that little things can definitely be distracting to her, citing an example where she was in [employer] and focused on a fly on the wall that was crawling and buzzing for approximately 30 minutes at the expense of getting any work done. Patient identified that caffeine generally slows her down, though she has tried to cut back on this recently. Patient is sleeping each night, not identifying any adverse effects associated with the current medications. The patient denied any SI/HI/AVH on evaluation today. ROS:  Review of Systems   Constitutional: Negative. HENT: Negative. Eyes: Negative. Respiratory: Negative. Cardiovascular: Negative. Gastrointestinal: Negative. Endocrine: Negative. Genitourinary: Negative. Musculoskeletal: Negative. Skin: Negative. Allergic/Immunologic: Negative. Neurological: Negative. Hematological: Negative. Psychiatric/Behavioral:  Positive for decreased concentration. Brief Medical Hx:   Patient Active Problem List   Diagnosis    PTSD (post-traumatic stress disorder)    Bipolar disorder, current episode mixed, moderate (HCC)    Social anxiety disorder        Brief Psych Hx:  Hosp: Denied  Diagnoses: Major depression  Med trials: Fluoxetine, trazodone, bupropion, sertraline  Outpt: History of multiple therapists however none currently  NSSI: Burning (lighter/pan)  Suicide Attempts: Denied    O:  Wt Readings from Last 3 Encounters:   09/08/22 141 lb (64 kg)   08/08/22 136 lb (61.7 kg) (63 %, Z= 0.34)*   07/21/22 140 lb 9.6 oz (63.8 kg) (70 %, Z= 0.52)*     * Growth percentiles are based on CDC (Girls, 2-20 Years) data.        Vitals:    09/08/22 0845   BP: 102/76   Pulse: 74   Weight: 141 lb (64 kg)       Mental Status Exam:   Appearance:    Appropriately dressed  Motor: Significant fidgeting during the evaluation including twisting a fidget ring as well as tapping her leg  Eye Contact: Fair to good  Speech:    Appropriate rate and rhythm  Language:   Appropriate diction  Mood/Affect:  \" I am okay\"/full range of affect  Thought Process:   Linear, logical  Thought Content:    Topic-appropriate, no SI/HI  Hallucinations:   Denied, not seem to be responding to internal stimuli  Associations:   Intact  Attention/Concentration:   Intact  Orientation:    Alert and oriented x4  Memory:   Intact  Fund of Knowledge:    Appropriate for age and education  Insight/Judgement:   Intact/intact      MOHAN-7 SCREENING 9/8/2022 8/8/2022   Feeling nervous, anxious, or on edge Several days Several days   Not being able to stop or control worrying Several days Several days   Worrying too much about different things Several days Not at all   Trouble relaxing More than half the days Several days   Being so restless that it is hard to sit still More than half the days More than half the days   Becoming easily annoyed or irritable Several days More than half the days   Feeling afraid as if something awful might happen Several days Not at all   MOHAN-7 Total Score 9 7   How difficult have these problems made it for you to do your work, take care of things at home, or get along with other people? - Very difficult     PHQ-9 Questionaire 9/8/2022 8/8/2022   Little interest or pleasure in doing things 1 1   Feeling down, depressed, or hopeless 1 0   Trouble falling or staying asleep, or sleeping too much 1 2   Feeling tired or having little energy - 2   Poor appetite or overeating 1 1   Feeling bad about yourself - or that you are a failure or have let yourself or your family down 1 0   Trouble concentrating on things, such as reading the newspaper or watching television 3 3   Moving or speaking so slowly that other people could have noticed.  Or the opposite - being so fidgety or restless that you have been moving around a lot more than usual 3 3   Thoughts that you would be better off dead, or of hurting yourself in some way 0 0   PHQ-9 Total Score 11 12   If you checked off any problems, how difficult have these problems made it for you to do your work, take care of things at home, or get along with other people? - 2        AIMS Scale  Facial and Oral Movements(AIMS)  Muscles of Facial Expression: None, normal  Lips and Perioral Area: None, normal  Jaw: None, normal  Tongue: None, normal  Extremity Movements(AIMS)  Upper (arms, wrists, hands, fingers): None, normal  Lower (legs, knees, ankles, toes): None, normal  Trunk Movements(AIMS)  Neck, shoulders, hips: None, normal  Overall Severity(AIMS)  Severity of abnormal movements (highest score from 1-7): 0  Incapacitation due to abnormal movements: None, normal  Patient's awareness of abnormal movements (rate only patient's report):  No Awareness  Total of items 1-7: 0     Labs:     Orders Only on 03/09/2022   Component Date Value Ref Range Status    Lithium Lvl 03/09/2022 0.3 (A) 0.6 - 1.2 mmol/L Final    Lithium Dose Amount 03/09/2022 Unknown   Final       EKG: None on file        Shaji Vyas MD  Psychiatrist

## 2022-09-30 DIAGNOSIS — F31.62 BIPOLAR DISORDER, CURRENT EPISODE MIXED, MODERATE (HCC): ICD-10-CM

## 2022-10-03 RX ORDER — LITHIUM CARBONATE 300 MG
TABLET ORAL
Qty: 90 TABLET | Refills: 2 | Status: SHIPPED | OUTPATIENT
Start: 2022-10-03 | End: 2022-11-03 | Stop reason: SDUPTHER

## 2022-10-06 DIAGNOSIS — F31.62 BIPOLAR DISORDER, CURRENT EPISODE MIXED, MODERATE (HCC): ICD-10-CM

## 2022-10-07 RX ORDER — OLANZAPINE 10 MG/1
10 TABLET ORAL NIGHTLY
Qty: 30 TABLET | Refills: 2 | Status: SHIPPED | OUTPATIENT
Start: 2022-10-07

## 2022-10-07 NOTE — TELEPHONE ENCOUNTER
Medication:   Requested Prescriptions     Pending Prescriptions Disp Refills    OLANZapine (ZYPREXA) 10 MG tablet 30 tablet 2     Sig: Take 1 tablet by mouth nightly        Last Filled:9/6/22      Patient Phone Number: [phone] (home)     Last appt: 9/8/2022   Next appt: 10/12/2022    Last OARRS: No flowsheet data found.

## 2022-10-10 NOTE — PROGRESS NOTES
PSYCHIATRY PROGRESS NOTE    Amberly Manzo  2000  10/12/22  Face to Face time: 30 minutes  PCP: Maxine Muller MD    CC:   Chief Complaint   Patient presents with    Follow-up     Patient is a 82-year-old female with significant past medical history of spondylosis and choreiform movements who presents to the outpatient psychiatric clinic today for evaluation of depression, anxiety, and manic behaviors. A:  Patient's presentation today is indicative of continued difficulties with inattention concerns that do appear to rise to the level of being diagnostic of an inattentive ADHD. Given this, and the subsequent failure of bupropion, we will attempt to modify the regimen to promote more stability. Diagnosis:  Bipolar 1 disorder, current episode moderate mixed symptoms  PTSD  ADHD inattentive concerns  Social anxiety disorder  Cannabis use disorder    P:   We will plan to maintain the patient on their current regimen of Lithium 300mg qam and 600mg qpm.  We will plan to discontinue bupropion from the patient's list secondary to being ineffective. We will plan to temporarily decrease the patient's olanzapine to 10mg nightly in the setting of hypersomnolence. The patient was strongly cautioned regarding steps to take should she start not sleeping, including restarting the secondary portion of olanzapine to increase to 12.5mg nightly or 15mg nightly should that first increase prove insufficient. We will plan to start atomoxetine 40mg daily for treatment of ADHD. Patient was cautioned regarding adverse effects of the medications including headaches, abdominal pain, nausea, appetite suppression, xerostomia, insomnia, and drowsiness.     Medication Monitoring:    - PDMP reviewed: No interval change    Follow-up: 4 weeks    Safety: Pt was counseled on the potential for increased suicidal ideations and advised on potential options for dealing with these including hotlines, calling the office, or going to the nearest emergency room. __________________________________________________________________________    S:   Patient noted that she was still struggling in some places. She stated that she has some significant difficulties now with maintaining focus that have not been improved by the bupropion. She noted that she gets distracted very easily, giving an example where she went to [employer] trying to study and \"just was focusing on the wall or on other peoples' conversations and I got nothing done\". The patient noted that she's also had worsening depressive mood states including some anhedonia towards rugby because she can no longer pay attention as much as she needs to in it. Grades are doing ok, hasn't gotten her midterm reports back just yet but thinks she's not doing badly. The patient identified that she's tending more to oversleeping at this point, finding that she's getting >11h nightly on some occasions and still feeling like it's not enough. Other days getting >13 hours with similar sensation. The patient denied any SI/HI/AVH. ROS:  Review of Systems   Constitutional:  Positive for fatigue. HENT: Negative. Eyes: Negative. Respiratory: Negative. Cardiovascular: Negative. Gastrointestinal: Negative. Endocrine: Negative. Genitourinary: Negative. Musculoskeletal: Negative. Skin: Negative. Allergic/Immunologic: Negative. Neurological: Negative. Hematological: Negative. Psychiatric/Behavioral:  Positive for decreased concentration and dysphoric mood.        Brief Medical Hx:   Patient Active Problem List   Diagnosis    PTSD (post-traumatic stress disorder)    Bipolar disorder, current episode mixed, moderate (HCC)    Social anxiety disorder    ADHD, predominantly inattentive type        Brief Psych Hx:  Hosp: Denied  Diagnoses: Major depression  Med trials: Fluoxetine, trazodone, bupropion, sertraline  Outpt: History of multiple therapists however none currently  NSSI: Burning (lighter/pan)  Suicide Attempts: Denied    O:  Wt Readings from Last 3 Encounters:   10/12/22 142 lb 3.2 oz (64.5 kg)   09/08/22 141 lb (64 kg)   08/08/22 136 lb (61.7 kg) (63 %, Z= 0.34)*     * Growth percentiles are based on [facility] (Girls, 2-20 Years) data. Vitals:    10/12/22 1136   BP: 110/70   Pulse: 88   SpO2: 97%   Weight: 142 lb 3.2 oz (64.5 kg)       Mental Status Exam:   Appearance:    Appropriately dressed  Motor: No abnormal movements, tics or mannerisms.   Eye Contact: Good  Speech:    Mildly slowed rate  Language:   Appropriate diction  Mood/Affect:  \" I do not know\"/mild blunting of affect  Thought Process:   Generally linear, logical  Thought Content:    Some depressive content present, no SI/HI  Hallucinations:   Denied, not seem to be responding to internal stimuli  Associations:   Intact  Attention/Concentration:   Intact  Orientation:    Alert and oriented x4  Memory:   Intact  Fund of Knowledge:    Appropriate for age and education  Insight/Judgement:   Intact/intact      MOHAN-7 SCREENING 10/12/2022 9/8/2022   Feeling nervous, anxious, or on edge Nearly every day Several days   Not being able to stop or control worrying More than half the days Several days   Worrying too much about different things Several days Several days   Trouble relaxing More than half the days More than half the days   Being so restless that it is hard to sit still More than half the days More than half the days   Becoming easily annoyed or irritable Several days Several days   Feeling afraid as if something awful might happen Several days Several days   MOHAN-7 Total Score 12 9   How difficult have these problems made it for you to do your work, take care of things at home, or get along with other people? - -     PHQ-9 Questionaire 10/12/2022 9/8/2022   Little interest or pleasure in doing things 1 1   Feeling down, depressed, or hopeless 2 1   Trouble falling or staying asleep, or sleeping too much 1 1   Feeling tired or having little energy 2 -   Poor appetite or overeating 1 1   Feeling bad about yourself - or that you are a failure or have let yourself or your family down 1 1   Trouble concentrating on things, such as reading the newspaper or watching television 2 3   Moving or speaking so slowly that other people could have noticed. Or the opposite - being so fidgety or restless that you have been moving around a lot more than usual 2 3   Thoughts that you would be better off dead, or of hurting yourself in some way 0 0   PHQ-9 Total Score 12 11   If you checked off any problems, how difficult have these problems made it for you to do your work, take care of things at home, or get along with other people? - -      Labs:     Orders Only on 09/08/2022   Component Date Value Ref Range Status    Sodium 09/08/2022 139  136 - 145 mmol/L Final    Potassium 09/08/2022 4.2  3.5 - 5.1 mmol/L Final    Chloride 09/08/2022 103  99 - 110 mmol/L Final    CO2 09/08/2022 25  21 - 32 mmol/L Final    Anion Gap 09/08/2022 11  3 - 16 Final    Glucose 09/08/2022 90  70 - 99 mg/dL Final    BUN 09/08/2022 8  7 - 20 mg/dL Final    Creatinine 09/08/2022 0.9  0.6 - 1.1 mg/dL Final    GFR Non- 09/08/2022 >60  >60 Final    Comment: >60 mL/min/1.73m2 EGFR, calc. for ages 25 and older using the  MDRD formula (not corrected for weight), is valid for stable  renal function. GFR  09/08/2022 >60  >60 Final    Comment: Chronic Kidney Disease: less than 60 ml/min/1.73 sq.m. Kidney Failure: less than 15 ml/min/1.73 sq.m. Results valid for patients 18 years and older.       Calcium 09/08/2022 9.9  8.3 - 10.6 mg/dL Final    Cholesterol, Fasting 09/08/2022 200 (A)  0 - 199 mg/dL Final    Triglyceride, Fasting 09/08/2022 45  0 - 150 mg/dL Final    HDL 09/08/2022 93 (A)  40 - 60 mg/dL Final    LDL Calculated 09/08/2022 98  <100 mg/dL Final    VLDL Cholesterol Calculated 09/08/2022 9  Not Established mg/dL Final    Lithium Lvl 09/08/2022 0.9  0.6 - 1.2 mmol/L Final    Lithium Dose Amount 09/08/2022 Unknown   Final       EKG: None on file        Cheyenne Davis MD  Psychiatrist

## 2022-10-12 ENCOUNTER — OFFICE VISIT (OUTPATIENT)
Dept: PSYCHIATRY | Age: 22
End: 2022-10-12
Payer: COMMERCIAL

## 2022-10-12 VITALS
HEART RATE: 88 BPM | OXYGEN SATURATION: 97 % | SYSTOLIC BLOOD PRESSURE: 110 MMHG | BODY MASS INDEX: 24.41 KG/M2 | DIASTOLIC BLOOD PRESSURE: 70 MMHG | WEIGHT: 142.2 LBS

## 2022-10-12 DIAGNOSIS — F31.62 BIPOLAR DISORDER, CURRENT EPISODE MIXED, MODERATE (HCC): ICD-10-CM

## 2022-10-12 DIAGNOSIS — F40.10 SOCIAL ANXIETY DISORDER: ICD-10-CM

## 2022-10-12 DIAGNOSIS — F90.0 ADHD, PREDOMINANTLY INATTENTIVE TYPE: Primary | ICD-10-CM

## 2022-10-12 DIAGNOSIS — F43.10 PTSD (POST-TRAUMATIC STRESS DISORDER): ICD-10-CM

## 2022-10-12 PROCEDURE — 99214 OFFICE O/P EST MOD 30 MIN: CPT | Performed by: STUDENT IN AN ORGANIZED HEALTH CARE EDUCATION/TRAINING PROGRAM

## 2022-10-12 PROCEDURE — G8420 CALC BMI NORM PARAMETERS: HCPCS | Performed by: STUDENT IN AN ORGANIZED HEALTH CARE EDUCATION/TRAINING PROGRAM

## 2022-10-12 PROCEDURE — G8427 DOCREV CUR MEDS BY ELIG CLIN: HCPCS | Performed by: STUDENT IN AN ORGANIZED HEALTH CARE EDUCATION/TRAINING PROGRAM

## 2022-10-12 PROCEDURE — G8484 FLU IMMUNIZE NO ADMIN: HCPCS | Performed by: STUDENT IN AN ORGANIZED HEALTH CARE EDUCATION/TRAINING PROGRAM

## 2022-10-12 PROCEDURE — 1036F TOBACCO NON-USER: CPT | Performed by: STUDENT IN AN ORGANIZED HEALTH CARE EDUCATION/TRAINING PROGRAM

## 2022-10-12 RX ORDER — ATOMOXETINE 40 MG/1
40 CAPSULE ORAL DAILY
Qty: 30 CAPSULE | Refills: 3 | Status: SHIPPED | OUTPATIENT
Start: 2022-10-12

## 2022-10-12 ASSESSMENT — PATIENT HEALTH QUESTIONNAIRE - PHQ9
6. FEELING BAD ABOUT YOURSELF - OR THAT YOU ARE A FAILURE OR HAVE LET YOURSELF OR YOUR FAMILY DOWN: 1
SUM OF ALL RESPONSES TO PHQ9 QUESTIONS 1 & 2: 3
7. TROUBLE CONCENTRATING ON THINGS, SUCH AS READING THE NEWSPAPER OR WATCHING TELEVISION: 2
4. FEELING TIRED OR HAVING LITTLE ENERGY: 2
2. FEELING DOWN, DEPRESSED OR HOPELESS: 2
SUM OF ALL RESPONSES TO PHQ QUESTIONS 1-9: 12
3. TROUBLE FALLING OR STAYING ASLEEP: 1
1. LITTLE INTEREST OR PLEASURE IN DOING THINGS: 1
5. POOR APPETITE OR OVEREATING: 1
9. THOUGHTS THAT YOU WOULD BE BETTER OFF DEAD, OR OF HURTING YOURSELF: 0
SUM OF ALL RESPONSES TO PHQ QUESTIONS 1-9: 12
8. MOVING OR SPEAKING SO SLOWLY THAT OTHER PEOPLE COULD HAVE NOTICED. OR THE OPPOSITE, BEING SO FIGETY OR RESTLESS THAT YOU HAVE BEEN MOVING AROUND A LOT MORE THAN USUAL: 2

## 2022-10-12 ASSESSMENT — ANXIETY QUESTIONNAIRES
6. BECOMING EASILY ANNOYED OR IRRITABLE: 1
3. WORRYING TOO MUCH ABOUT DIFFERENT THINGS: 1
7. FEELING AFRAID AS IF SOMETHING AWFUL MIGHT HAPPEN: 1
4. TROUBLE RELAXING: 2
5. BEING SO RESTLESS THAT IT IS HARD TO SIT STILL: 2
GAD7 TOTAL SCORE: 12
2. NOT BEING ABLE TO STOP OR CONTROL WORRYING: 2
1. FEELING NERVOUS, ANXIOUS, OR ON EDGE: 3

## 2022-10-13 ASSESSMENT — ENCOUNTER SYMPTOMS
ALLERGIC/IMMUNOLOGIC NEGATIVE: 1
GASTROINTESTINAL NEGATIVE: 1
RESPIRATORY NEGATIVE: 1
EYES NEGATIVE: 1

## 2022-11-03 DIAGNOSIS — F31.62 BIPOLAR DISORDER, CURRENT EPISODE MIXED, MODERATE (HCC): ICD-10-CM

## 2022-11-04 RX ORDER — LITHIUM CARBONATE 300 MG
TABLET ORAL
Qty: 90 TABLET | Refills: 2 | Status: SHIPPED | OUTPATIENT
Start: 2022-11-04

## 2022-11-04 NOTE — TELEPHONE ENCOUNTER
Medication:   Requested Prescriptions     Pending Prescriptions Disp Refills    lithium 300 MG tablet 90 tablet 2     Sig: Take 1 tablet in the AM and 2 tablets every evening for a total of 900mg daily        Last Filled:  10/03/22    Patient Phone Number: [phone] (home)     Last appt: 10/12/2022   Next appt: 11/16/2022    Last OARRS: No flowsheet data found.

## 2022-11-15 DIAGNOSIS — F31.62 BIPOLAR DISORDER, CURRENT EPISODE MIXED, MODERATE (HCC): ICD-10-CM

## 2022-11-16 ENCOUNTER — OFFICE VISIT (OUTPATIENT)
Dept: PSYCHIATRY | Age: 22
End: 2022-11-16
Payer: COMMERCIAL

## 2022-11-16 VITALS
OXYGEN SATURATION: 96 % | SYSTOLIC BLOOD PRESSURE: 108 MMHG | WEIGHT: 147 LBS | DIASTOLIC BLOOD PRESSURE: 72 MMHG | BODY MASS INDEX: 25.23 KG/M2 | HEART RATE: 77 BPM

## 2022-11-16 DIAGNOSIS — F31.62 BIPOLAR DISORDER, CURRENT EPISODE MIXED, MODERATE (HCC): ICD-10-CM

## 2022-11-16 DIAGNOSIS — F90.0 ADHD, PREDOMINANTLY INATTENTIVE TYPE: Primary | ICD-10-CM

## 2022-11-16 PROCEDURE — 99214 OFFICE O/P EST MOD 30 MIN: CPT | Performed by: STUDENT IN AN ORGANIZED HEALTH CARE EDUCATION/TRAINING PROGRAM

## 2022-11-16 PROCEDURE — 1036F TOBACCO NON-USER: CPT | Performed by: STUDENT IN AN ORGANIZED HEALTH CARE EDUCATION/TRAINING PROGRAM

## 2022-11-16 PROCEDURE — G8419 CALC BMI OUT NRM PARAM NOF/U: HCPCS | Performed by: STUDENT IN AN ORGANIZED HEALTH CARE EDUCATION/TRAINING PROGRAM

## 2022-11-16 PROCEDURE — G8484 FLU IMMUNIZE NO ADMIN: HCPCS | Performed by: STUDENT IN AN ORGANIZED HEALTH CARE EDUCATION/TRAINING PROGRAM

## 2022-11-16 PROCEDURE — G8427 DOCREV CUR MEDS BY ELIG CLIN: HCPCS | Performed by: STUDENT IN AN ORGANIZED HEALTH CARE EDUCATION/TRAINING PROGRAM

## 2022-11-16 RX ORDER — DEXTROAMPHETAMINE SACCHARATE, AMPHETAMINE ASPARTATE, DEXTROAMPHETAMINE SULFATE AND AMPHETAMINE SULFATE 1.25; 1.25; 1.25; 1.25 MG/1; MG/1; MG/1; MG/1
5 TABLET ORAL 2 TIMES DAILY
Qty: 28 TABLET | Refills: 0 | Status: SHIPPED | OUTPATIENT
Start: 2022-11-16 | End: 2022-11-28

## 2022-11-16 RX ORDER — OLANZAPINE 5 MG/1
2.5 TABLET ORAL NIGHTLY
Qty: 30 TABLET | Refills: 2 | Status: SHIPPED | OUTPATIENT
Start: 2022-11-16

## 2022-11-16 ASSESSMENT — ENCOUNTER SYMPTOMS
EYES NEGATIVE: 1
ALLERGIC/IMMUNOLOGIC NEGATIVE: 1
RESPIRATORY NEGATIVE: 1
GASTROINTESTINAL NEGATIVE: 1

## 2022-11-16 ASSESSMENT — PATIENT HEALTH QUESTIONNAIRE - PHQ9
8. MOVING OR SPEAKING SO SLOWLY THAT OTHER PEOPLE COULD HAVE NOTICED. OR THE OPPOSITE, BEING SO FIGETY OR RESTLESS THAT YOU HAVE BEEN MOVING AROUND A LOT MORE THAN USUAL: 3
5. POOR APPETITE OR OVEREATING: 2
SUM OF ALL RESPONSES TO PHQ9 QUESTIONS 1 & 2: 3
7. TROUBLE CONCENTRATING ON THINGS, SUCH AS READING THE NEWSPAPER OR WATCHING TELEVISION: 3
SUM OF ALL RESPONSES TO PHQ QUESTIONS 1-9: 15
2. FEELING DOWN, DEPRESSED OR HOPELESS: 1
1. LITTLE INTEREST OR PLEASURE IN DOING THINGS: 2
6. FEELING BAD ABOUT YOURSELF - OR THAT YOU ARE A FAILURE OR HAVE LET YOURSELF OR YOUR FAMILY DOWN: 1
SUM OF ALL RESPONSES TO PHQ QUESTIONS 1-9: 15
4. FEELING TIRED OR HAVING LITTLE ENERGY: 1
9. THOUGHTS THAT YOU WOULD BE BETTER OFF DEAD, OR OF HURTING YOURSELF: 0
SUM OF ALL RESPONSES TO PHQ QUESTIONS 1-9: 15
SUM OF ALL RESPONSES TO PHQ QUESTIONS 1-9: 15
3. TROUBLE FALLING OR STAYING ASLEEP: 2

## 2022-11-16 ASSESSMENT — ANXIETY QUESTIONNAIRES
3. WORRYING TOO MUCH ABOUT DIFFERENT THINGS: 1
1. FEELING NERVOUS, ANXIOUS, OR ON EDGE: 2
7. FEELING AFRAID AS IF SOMETHING AWFUL MIGHT HAPPEN: 2
4. TROUBLE RELAXING: 2
2. NOT BEING ABLE TO STOP OR CONTROL WORRYING: 1
GAD7 TOTAL SCORE: 11
6. BECOMING EASILY ANNOYED OR IRRITABLE: 1
5. BEING SO RESTLESS THAT IT IS HARD TO SIT STILL: 2

## 2022-11-16 NOTE — PROGRESS NOTES
PSYCHIATRY PROGRESS NOTE    Amadeo Antoine  2000  11/16/22  Face to Face time: 30 minutes  PCP: Javier Hallman MD    CC:   Chief Complaint   Patient presents with    Follow-up    ADHD     Patient is a 30-year-old female with significant past medical history of spondylosis and choreiform movements who presents to the outpatient psychiatric clinic today for evaluation of depression, anxiety, and manic behaviors. A:  Patient presentation today is indicative of a minor depressive episode happening right now that is precipitated by the patient's difficulties with focus and attention courtesy of their ADHD. We will attempt to adjust the medications in order to provide him with further support. Diagnosis:  Bipolar 1 disorder, current episode moderate mixed symptoms  PTSD  ADHD inattentive subtype predominant  Social anxiety disorder  Cannabis use disorder    P:   1. We will plan to discontinue atomoxetine at this time secondary to medication being ineffective. 2.  We will plan to continue the patient's lithium 300 mg every morning and 600 mg every afternoon as well as olanzapine 10 to 15 mg nightly depending on current bipolar phase. 3.  We will plan to start Adderall IR 5 mg twice daily for treatment of ADHD concerns. Patient was cautioned regarding potential side effects of stimulants including headaches, appetite suppression, insomnia, and hypertension. Pt was advised not to take any doses of medication later than 3pm to try and avoid insomnia SE. Patient was also cautioned regarding such severe side effects as stimulant induced rani as well as stimulant induced psychosis and advised to seek medical help should some of the symptoms present themselves.       Medication Monitoring:    - PDMP reviewed: No current prescriptions, no interval change    Follow-up: 4 weeks    Safety: Pt was counseled on the potential for increased suicidal ideations and advised on potential options for dealing with these including hotlines, calling the office, or going to the nearest emergency room. __________________________________________________________________________    S:   Patient endorsed that she was experiencing a little bit of chaos in her life at this moment. She was largely pissed off that she was still unable to focus, found that the atomoxetine was not helpful in that regard. Patient noted that she was really trying to finish the semester strong but struggling significantly to do so. She was having a bit more of a depressive episode as a result of this, felt that she was staying in bed a little bit more often. That being said, she had reduced on the olanzapine to 10 mg and did find that she was not sleeping as much as she had in previous episodes. Patient endorsed that she was looking forward to spending the upcoming holiday in [place] with her family. She denied any SI/HI/AVH on evaluation today. ROS:  Review of Systems   Constitutional: Negative. HENT: Negative. Eyes: Negative. Respiratory: Negative. Cardiovascular: Negative. Gastrointestinal: Negative. Endocrine: Negative. Genitourinary: Negative. Musculoskeletal: Negative. Skin: Negative. Allergic/Immunologic: Negative. Neurological: Negative. Hematological: Negative. Psychiatric/Behavioral:  Positive for decreased concentration and dysphoric mood.        Brief Medical Hx:   Patient Active Problem List   Diagnosis    PTSD (post-traumatic stress disorder)    Bipolar disorder, current episode mixed, moderate (HCC)    Social anxiety disorder    ADHD, predominantly inattentive type        Brief Psych Hx:  Hosp: Denied  Diagnoses: Major depression  Med trials: Fluoxetine, trazodone, bupropion, sertraline  Outpt: History of multiple therapists however none currently  NSSI: Burning (lighter/pan)  Suicide Attempts: Denied    O:  Wt Readings from Last 3 Encounters:   11/16/22 147 lb (66.7 kg)   10/12/22 142 lb 3.2 oz (64.5 kg)   09/08/22 141 lb (64 kg)       Vitals:    11/16/22 1142   BP: 108/72   Pulse: 77   SpO2: 96%   Weight: 147 lb (66.7 kg)       Mental Status Exam:   Appearance:    Appropriately dressed  Motor: No abnormal movements, tics or mannerisms.   Eye Contact: Good  Speech:    Appropriate rate and rhythm  Language:   Appropriate diction  Mood/Affect:  \" A little chaotic\"/some blunting of affect  Thought Process:   Linear, logical  Thought Content:    Some depressive content present, no SI/HI  Hallucinations:   Denied, not seem to be responding to internal stimuli  Associations:   Intact  Attention/Concentration:   Intact  Orientation:    Alert and oriented x4  Memory:   Intact  Fund of Knowledge:    Appropriate for age and education  Insight/Judgement:   Intact/intact      MOHAN-7 SCREENING 11/16/2022 10/12/2022   Feeling nervous, anxious, or on edge More than half the days Nearly every day   Not being able to stop or control worrying Several days More than half the days   Worrying too much about different things Several days Several days   Trouble relaxing More than half the days More than half the days   Being so restless that it is hard to sit still More than half the days More than half the days   Becoming easily annoyed or irritable Several days Several days   Feeling afraid as if something awful might happen More than half the days Several days   MOHAN-7 Total Score 11 12   How difficult have these problems made it for you to do your work, take care of things at home, or get along with other people? - -     PHQ-9 Questionaire 11/16/2022 10/12/2022   Little interest or pleasure in doing things 2 1   Feeling down, depressed, or hopeless 1 2   Trouble falling or staying asleep, or sleeping too much 2 1   Feeling tired or having little energy 1 2   Poor appetite or overeating 2 1   Feeling bad about yourself - or that you are a failure or have let yourself or your family down 1 1   Trouble concentrating on things, such as reading the newspaper or watching television 3 2   Moving or speaking so slowly that other people could have noticed. Or the opposite - being so fidgety or restless that you have been moving around a lot more than usual 3 2   Thoughts that you would be better off dead, or of hurting yourself in some way 0 0   PHQ-9 Total Score 15 12   If you checked off any problems, how difficult have these problems made it for you to do your work, take care of things at home, or get along with other people? - -      Labs:     Orders Only on 09/08/2022   Component Date Value Ref Range Status    Sodium 09/08/2022 139  136 - 145 mmol/L Final    Potassium 09/08/2022 4.2  3.5 - 5.1 mmol/L Final    Chloride 09/08/2022 103  99 - 110 mmol/L Final    CO2 09/08/2022 25  21 - 32 mmol/L Final    Anion Gap 09/08/2022 11  3 - 16 Final    Glucose 09/08/2022 90  70 - 99 mg/dL Final    BUN 09/08/2022 8  7 - 20 mg/dL Final    Creatinine 09/08/2022 0.9  0.6 - 1.1 mg/dL Final    GFR Non- 09/08/2022 >60  >60 Final    Comment: >60 mL/min/1.73m2 EGFR, calc. for ages 25 and older using the  MDRD formula (not corrected for weight), is valid for stable  renal function. GFR  09/08/2022 >60  >60 Final    Comment: Chronic Kidney Disease: less than 60 ml/min/1.73 sq.m. Kidney Failure: less than 15 ml/min/1.73 sq.m. Results valid for patients 18 years and older.       Calcium 09/08/2022 9.9  8.3 - 10.6 mg/dL Final    Cholesterol, Fasting 09/08/2022 200 (A)  0 - 199 mg/dL Final    Triglyceride, Fasting 09/08/2022 45  0 - 150 mg/dL Final    HDL 09/08/2022 93 (A)  40 - 60 mg/dL Final    LDL Calculated 09/08/2022 98  <100 mg/dL Final    VLDL Cholesterol Calculated 09/08/2022 9  Not Established mg/dL Final    Lithium Lvl 09/08/2022 0.9  0.6 - 1.2 mmol/L Final    Lithium Dose Amount 09/08/2022 Unknown   Final       EKG: None on file        Karyn Davenport MD  Psychiatrist

## 2022-11-16 NOTE — TELEPHONE ENCOUNTER
Medication:   Requested Prescriptions     Pending Prescriptions Disp Refills    OLANZapine (ZYPREXA) 10 MG tablet 30 tablet 2     Sig: Take 1 tablet by mouth nightly        Last Filled:      Patient Phone Number: [phone] (home)     Last appt: 10/12/2022   Next appt: 11/16/2022    Last OARRS: No flowsheet data found.

## 2022-11-17 RX ORDER — OLANZAPINE 10 MG/1
10 TABLET ORAL NIGHTLY
Qty: 30 TABLET | Refills: 2 | OUTPATIENT
Start: 2022-11-17

## 2022-11-29 DIAGNOSIS — F31.62 BIPOLAR DISORDER, CURRENT EPISODE MIXED, MODERATE (HCC): ICD-10-CM

## 2022-11-29 RX ORDER — OLANZAPINE 10 MG/1
10 TABLET ORAL NIGHTLY
Qty: 30 TABLET | Refills: 2 | Status: SHIPPED | OUTPATIENT
Start: 2022-11-29 | End: 2022-12-13 | Stop reason: SDUPTHER

## 2022-11-29 NOTE — TELEPHONE ENCOUNTER
Medication:   Requested Prescriptions     Pending Prescriptions Disp Refills    OLANZapine (ZYPREXA) 10 MG tablet 30 tablet 2     Sig: Take 1 tablet by mouth nightly        Last Filled:  11/16/22    Patient Phone Number: [phone] (home)     Last appt: 11/16/2022   Next appt: 12/12/2022    Last OARRS: No flowsheet data found.

## 2022-12-07 ENCOUNTER — HOSPITAL ENCOUNTER (EMERGENCY)
Age: 22
Discharge: HOME | End: 2022-12-07
Attending: EMERGENCY MEDICINE
Payer: COMMERCIAL

## 2022-12-07 VITALS
TEMPERATURE: 98.8 F | SYSTOLIC BLOOD PRESSURE: 113 MMHG | DIASTOLIC BLOOD PRESSURE: 62 MMHG | RESPIRATION RATE: 16 BRPM | HEART RATE: 102 BPM | OXYGEN SATURATION: 98 %

## 2022-12-07 DIAGNOSIS — J02.9 VIRAL PHARYNGITIS: Primary | ICD-10-CM

## 2022-12-07 LAB — S PYO AG THROAT QL: NEGATIVE

## 2022-12-07 PROCEDURE — 87880 STREP A ASSAY W/OPTIC: CPT

## 2022-12-07 PROCEDURE — 99283 EMERGENCY DEPT VISIT LOW MDM: CPT

## 2022-12-07 PROCEDURE — 87081 CULTURE SCREEN ONLY: CPT

## 2022-12-07 ASSESSMENT — PAIN SCALES - GENERAL: PAINLEVEL_OUTOF10: 3

## 2022-12-07 ASSESSMENT — PAIN DESCRIPTION - LOCATION: LOCATION: THROAT

## 2022-12-07 ASSESSMENT — PAIN - FUNCTIONAL ASSESSMENT: PAIN_FUNCTIONAL_ASSESSMENT: 0-10

## 2022-12-08 DIAGNOSIS — F31.62 BIPOLAR DISORDER, CURRENT EPISODE MIXED, MODERATE (HCC): ICD-10-CM

## 2022-12-08 DIAGNOSIS — F90.0 ADHD, PREDOMINANTLY INATTENTIVE TYPE: ICD-10-CM

## 2022-12-08 RX ORDER — DEXTROAMPHETAMINE SACCHARATE, AMPHETAMINE ASPARTATE, DEXTROAMPHETAMINE SULFATE AND AMPHETAMINE SULFATE 3.75; 3.75; 3.75; 3.75 MG/1; MG/1; MG/1; MG/1
15 TABLET ORAL 2 TIMES DAILY
Qty: 28 TABLET | Refills: 0 | Status: CANCELLED | OUTPATIENT
Start: 2022-12-08 | End: 2022-12-22

## 2022-12-08 RX ORDER — LITHIUM CARBONATE 300 MG
TABLET ORAL
Qty: 90 TABLET | Refills: 2 | Status: CANCELLED | OUTPATIENT
Start: 2022-12-08

## 2022-12-08 NOTE — ED TRIAGE NOTES
Pt presented to ED with sore throat with white drainage in throat, pt is alert and oriented x4, not other complaints

## 2022-12-08 NOTE — DISCHARGE INSTRUCTIONS
Discharge home  Salt water gargles  Tylenol or Motrin for sore throat  Follow-up with your family doctor

## 2022-12-08 NOTE — ED PROVIDER NOTES
[address] [facility]  eMERGENCY dEPARTMENT eNCOUnter      Pt Name: Jerel Dunbar  MRN: [id number]  [place] 2000  Date of evaluation: 12/7/2022  Provider: Stacy Durbin MD  PCP: Jo Rosas MD      [address] [place] [address]       Chief Complaint   Patient presents with    Pharyngitis     Sore throat for several days, noticed white drainage on tonsils, and cough       HISTORY OFPRESENT ILLNESS   (Location/Symptom, Timing/Onset, Context/Setting, Quality, Duration, Modifying Factors,Severity)  Note limiting factors. Jerel Dunbar is a 23 y.o. female presents with complaints of sore throat that she has had for several days no difficulty breathing or swallowing but noticed some white dots on the back of her tonsils she denies any fevers or chills nausea vomiting or any sick contacts    Nursing Notes were all reviewed and agreed with or any disagreements were addressed  in the HPI. REVIEW OF SYSTEMS    (2-9 systems for level 4, 10 or more for level 5)     Review of Systems    Positives and Pertinent negatives as per HPI. Except as noted above in the ROS, all other systems were reviewed andnegative.        PASTMEDICAL HISTORY     Past Medical History:   Diagnosis Date    Acne     Anxiety and depression     Arthritis of right hip     Chorea     saw [facility] neurology    Dyslexia     has IEP    Family history of breast cancer     Low back pain     sp esiBilateral L5 spondylolysis with grade 1 spondylolisthesis    MTHFR mutation     heterozygous    Oligomenorrhea     probable pcos         SURGICAL HISTORY       Past Surgical History:   Procedure Laterality Date    TYMPANOSTOMY TUBE PLACEMENT           CURRENT MEDICATIONS       Previous Medications    ALBUTEROL SULFATE  (90 BASE) MCG/ACT INHALER    Inhale 2 puffs into the lungs every 6 hours as needed    AMPHETAMINE-DEXTROAMPHETAMINE (ADDERALL, 15MG,) 15 MG TABLET    Take 1 tablet by mouth 2 times daily at 0800 and 1400 for 14 days.    LITHIUM 300 MG TABLET    Take 1 tablet in the AM and 2 tablets every evening for a total of 900mg daily    OLANZAPINE (ZYPREXA) 10 MG TABLET    Take 1 tablet by mouth nightly    OLANZAPINE (ZYPREXA) 5 MG TABLET    Take 0.5 tablets by mouth nightly       ALLERGIES     Zithromax [azithromycin] and Prozac [fluoxetine]    FAMILY HISTORY       Family History   Problem Relation Age of Onset    Depression Mother         Undiagnosed    Depression Father         Seasonal, Sertraline    Heart Disease Paternal Grandfather     Suicide Paternal Cousin     ADHD Paternal Cousin           SOCIAL HISTORY       Social History     Socioeconomic History    Marital status: Single     Spouse name: None    Number of children: 0    Years of education: 13    Highest education level: Some college, no degree   Occupational History    Occupation:  - environmental studies     Employer: [employer]   Tobacco Use    Smoking status: Never    Smokeless tobacco: Never   Substance and Sexual Activity    Alcohol use: Yes     Comment: Social wine drinking    Drug use: Yes     Frequency: 1.0 times per week     Types: Marijuana (Weed)     Comment: Maximum during freshman year of college was 5 g daily. Currently 1 delta 8 every 3 weeks. Trialed cocaine 1 time, mushrooms 3 times    Sexual activity: Not Currently     Partners: Female     Comment: Identifies as nonbinary. Avoidance behaviors towards sexual contact   Social History Narrative    Patient is currently enrolled at , grades are okay with a grade point average of 2.8 (\"pretty good\" by her standards). Patient lives with a male roommate who she is platonic friends with. She came out as nonbinary to her parents a couple of weeks ago. Previously worked at [employer] though not currently, left because employment was too far from [facility]. High school grades were as follows: Freshman year C's, sophomore year B's, douglas year A's, senior year A's and B's. No guns at home, no legal issues currently, no  service for the patient       SCREENINGS    [place] Coma Scale  Eye Opening: Spontaneous  Best Verbal Response: Oriented  Best Motor Response: Obeys commands  David Coma Scale Score: 15 @FLOW([id number])@      PHYSICAL EXAM    (up to 7 for level 4, 8 or more for level 5)     ED Triage Vitals [12/07/22 2238]   BP Temp Temp Source Heart Rate Resp SpO2 Height Weight   113/62 98.8 °F (37.1 °C) Oral (!) 102 16 98 % -- --       Physical Exam      General Appearance:  Alert, cooperative, no distress, appears stated age. Head:  Normocephalic, without obviousabnormality, atraumatic. Eyes:  conjunctiva/corneas clear, EOM's intact. Sclera anicteric. ENT: Mucous membranes moist.  Tonsils are normal size there is mild erythema but no purulent exudates the uvula is normal size there is no anterior cervical lymphadenopathy   Neck: Supple, symmetrical, trachea midline, no adenopathy. No jugular venous distention. No stridor   Lungs:   Clear to auscultation bilaterally, respirationsunlabored. No rales, rhonchi or wheezes. Chest Wall:  No tenderness. Heart:  Regular rate and rhythm, S1 and S2 normal, no murmur, rub or gallop. Abdomen:   Soft, non-tender, bowel sounds active,   no masses, no organomegaly. Extremities: No edema, cords or calf tenderness. Full range of motion. Pulses: 2+ and symmetric   Skin: Turgor is normal, no rashes or lesions. Neurologic: Alert and oriented X 3. No focal findings. Motor grossly normal.  Speech clear, no drift, CN III-XII grossly intact,        DIAGNOSTIC RESULTS   LABS:    Labs Reviewed   STREP SCREEN GROUP A THROAT   CULTURE, BETA STREP CONFIRM PLATES       All other labs were within normal range or not returned as of this dictation. EKG:  All EKG's are interpreted by the Emergency Department Physician who eithersigns or Co-signs this chart in the absence of a cardiologist.        RADIOLOGY:   Non-plain film images such as CT, Ultrasound and MRI are read by the radiologist. Plain radiographic images are visualized by myself. *    Interpretation per the Radiologist below, if available at the time of this note:    No orders to display         PROCEDURES   Unless otherwise noted below, none     Procedures    *    CRITICAL CARE TIME   N/A      EMERGENCY DEPARTMENT COURSE and DIFFERENTIALDIAGNOSIS/MDM:   Vitals:    Vitals:    12/07/22 2238   BP: 113/62   Pulse: (!) 102   Resp: 16   Temp: 98.8 °F (37.1 °C)   TempSrc: Oral   SpO2: 98%       Patient was given thefollowing medications:  Medications - No data to display        The patient tolerated their visit well. The patient and / or the familywere informed of the results of any tests, a time was given to answer questions. FINAL IMPRESSION      1.  Viral pharyngitis          DISPOSITION/PLAN   DISPOSITION Decision To Discharge 12/07/2022 11:02:48 PM      PATIENT REFERRED TO:  Elmer Reese, [address] [facility] [facility] [address]  [phone]      As needed      DISCHARGE MEDICATIONS:  New Prescriptions    No medications on file       DISCONTINUED MEDICATIONS:  Discontinued Medications    No medications on file              (Please note that portions of this note were completed with a voice recognition program.  Efforts were made to edit the dictations but occasionally words are mis-transcribed.)    Nuria Valle MD (electronically signed)       Nuria Valle MD  12/07/22 1463

## 2022-12-10 LAB — S PYO THROAT QL CULT: NORMAL

## 2022-12-12 ENCOUNTER — OFFICE VISIT (OUTPATIENT)
Dept: PSYCHIATRY | Age: 22
End: 2022-12-12
Payer: COMMERCIAL

## 2022-12-12 VITALS
WEIGHT: 150 LBS | BODY MASS INDEX: 25.75 KG/M2 | OXYGEN SATURATION: 95 % | DIASTOLIC BLOOD PRESSURE: 70 MMHG | HEART RATE: 97 BPM | SYSTOLIC BLOOD PRESSURE: 102 MMHG

## 2022-12-12 DIAGNOSIS — F40.10 SOCIAL ANXIETY DISORDER: ICD-10-CM

## 2022-12-12 DIAGNOSIS — F43.10 PTSD (POST-TRAUMATIC STRESS DISORDER): ICD-10-CM

## 2022-12-12 DIAGNOSIS — F90.0 ADHD, PREDOMINANTLY INATTENTIVE TYPE: Primary | ICD-10-CM

## 2022-12-12 DIAGNOSIS — F31.62 BIPOLAR DISORDER, CURRENT EPISODE MIXED, MODERATE (HCC): ICD-10-CM

## 2022-12-12 PROCEDURE — 1036F TOBACCO NON-USER: CPT | Performed by: STUDENT IN AN ORGANIZED HEALTH CARE EDUCATION/TRAINING PROGRAM

## 2022-12-12 PROCEDURE — G8484 FLU IMMUNIZE NO ADMIN: HCPCS | Performed by: STUDENT IN AN ORGANIZED HEALTH CARE EDUCATION/TRAINING PROGRAM

## 2022-12-12 PROCEDURE — G8427 DOCREV CUR MEDS BY ELIG CLIN: HCPCS | Performed by: STUDENT IN AN ORGANIZED HEALTH CARE EDUCATION/TRAINING PROGRAM

## 2022-12-12 PROCEDURE — G8419 CALC BMI OUT NRM PARAM NOF/U: HCPCS | Performed by: STUDENT IN AN ORGANIZED HEALTH CARE EDUCATION/TRAINING PROGRAM

## 2022-12-12 PROCEDURE — 99214 OFFICE O/P EST MOD 30 MIN: CPT | Performed by: STUDENT IN AN ORGANIZED HEALTH CARE EDUCATION/TRAINING PROGRAM

## 2022-12-12 RX ORDER — DEXTROAMPHETAMINE SACCHARATE, AMPHETAMINE ASPARTATE, DEXTROAMPHETAMINE SULFATE AND AMPHETAMINE SULFATE 3.75; 3.75; 3.75; 3.75 MG/1; MG/1; MG/1; MG/1
TABLET ORAL
Qty: 90 TABLET | Refills: 0 | Status: SHIPPED | OUTPATIENT
Start: 2022-12-12 | End: 2023-01-11

## 2022-12-12 ASSESSMENT — PATIENT HEALTH QUESTIONNAIRE - PHQ9
7. TROUBLE CONCENTRATING ON THINGS, SUCH AS READING THE NEWSPAPER OR WATCHING TELEVISION: 2
9. THOUGHTS THAT YOU WOULD BE BETTER OFF DEAD, OR OF HURTING YOURSELF: 0
6. FEELING BAD ABOUT YOURSELF - OR THAT YOU ARE A FAILURE OR HAVE LET YOURSELF OR YOUR FAMILY DOWN: 1
SUM OF ALL RESPONSES TO PHQ9 QUESTIONS 1 & 2: 2
3. TROUBLE FALLING OR STAYING ASLEEP: 2
SUM OF ALL RESPONSES TO PHQ QUESTIONS 1-9: 10
SUM OF ALL RESPONSES TO PHQ QUESTIONS 1-9: 10
1. LITTLE INTEREST OR PLEASURE IN DOING THINGS: 1
8. MOVING OR SPEAKING SO SLOWLY THAT OTHER PEOPLE COULD HAVE NOTICED. OR THE OPPOSITE, BEING SO FIGETY OR RESTLESS THAT YOU HAVE BEEN MOVING AROUND A LOT MORE THAN USUAL: 1
5. POOR APPETITE OR OVEREATING: 1
4. FEELING TIRED OR HAVING LITTLE ENERGY: 1
2. FEELING DOWN, DEPRESSED OR HOPELESS: 1
SUM OF ALL RESPONSES TO PHQ QUESTIONS 1-9: 10
SUM OF ALL RESPONSES TO PHQ QUESTIONS 1-9: 10

## 2022-12-12 ASSESSMENT — ANXIETY QUESTIONNAIRES
5. BEING SO RESTLESS THAT IT IS HARD TO SIT STILL: 2
3. WORRYING TOO MUCH ABOUT DIFFERENT THINGS: 1
1. FEELING NERVOUS, ANXIOUS, OR ON EDGE: 2
7. FEELING AFRAID AS IF SOMETHING AWFUL MIGHT HAPPEN: 1
6. BECOMING EASILY ANNOYED OR IRRITABLE: 1
GAD7 TOTAL SCORE: 9
2. NOT BEING ABLE TO STOP OR CONTROL WORRYING: 1
4. TROUBLE RELAXING: 1

## 2022-12-12 NOTE — PROGRESS NOTES
PSYCHIATRY PROGRESS NOTE    Mariely Veliz  2000  12/12/22  Face to Face time: 30 minutes  PCP: Alex Auguste MD    CC:   Chief Complaint   Patient presents with    Follow-up    ADHD     Patient is a 82-year-old female with significant past medical history of spondylosis and choreiform movements who presents to the outpatient psychiatric clinic today for evaluation of depression, anxiety, and manic behaviors. A:  Patient's presentation today is indicative of an improvement in her overall mood and her ADHD symptoms with the increased dose of the Adderall. No mood instabilities on the medication noted, so we'll plan to continue with the current regimen and monitor. Diagnosis:  Bipolar 1 disorder, current episode moderate mixed symptoms  PTSD  ADHD inattentive subtype predominant  Social anxiety disorder  Cannabis use disorder    P:   We will plan to continue with lithium 300mg qam/600mg qpm, olanzapine 10mg nightly with a 2.5mg backup as needed, and Adderall IR 30mg qam/15mg qafternoon. We will plan to order a UDS for monitoring    Medication Monitoring:    - PDMP reviewed: Adderall IR 15mg BID 14-day supply filled 11/29/2022. Follow-up: 4 weeks    Safety: Pt was counseled on the potential for increased suicidal ideations and advised on potential options for dealing with these including hotlines, calling the office, or going to the nearest emergency room. __________________________________________________________________________    S:   Patient indicated that she was actually doing really well at this time. She noted that the Adderall had provided her with a significant improvement to her mood as well as to her academic studies. She was going to finish the semester with 1 class that she was going to fail (she stopped going), 2 B+s, and 1 A. She noted that she is now more motivated to get tasks done and has extra free time at the end of the day that she cannot relax.   This has allowed her a better overall mood state. No episodes of rani or sleeplessness associated with the medication. Some decrease in appetite has been noted. Unfortunately, with the patient increasing her medication she did run out early, has been out for the last 3 days. The patient denied any SI/HI/AVH. ROS:  Review of Systems   Constitutional:  Positive for appetite change. HENT: Negative. Eyes: Negative. Respiratory: Negative. Cardiovascular: Negative. Gastrointestinal: Negative. Endocrine: Negative. Genitourinary: Negative. Musculoskeletal: Negative. Skin: Negative. Allergic/Immunologic: Negative. Neurological: Negative. Hematological: Negative. Psychiatric/Behavioral:  Positive for decreased concentration and dysphoric mood. The patient is nervous/anxious.        Brief Medical Hx:   Patient Active Problem List   Diagnosis    PTSD (post-traumatic stress disorder)    Bipolar disorder, current episode mixed, moderate (HCC)    Social anxiety disorder    ADHD, predominantly inattentive type        Brief Psych Hx:  Hosp: Denied  Diagnoses: Major depression  Med trials: Fluoxetine, trazodone, bupropion, sertraline  Outpt: History of multiple therapists however none currently  NSSI: Burning (lighter/pan)  Suicide Attempts: Denied    O:  Wt Readings from Last 3 Encounters:   12/12/22 150 lb (68 kg)   11/16/22 147 lb (66.7 kg)   10/12/22 142 lb 3.2 oz (64.5 kg)       Vitals:    12/12/22 1406   BP: 102/70   Pulse: 97   SpO2: 95%   Weight: 150 lb (68 kg)       Mental Status Exam (off medication):   Appearance:    Appropriately dressed  Motor: Fidgeting noted  Eye Contact: Generally good  Speech:    Soft pattern again noted  Language:   Appropriate diction  Mood/Affect:  \"Better\"/ Some mild blunting but responsive to appropriate stimuli  Thought Process:   Linear, logical  Thought Content:    Topic-appropriate, no SI/HI  Hallucinations:   Denied, not seen to be responding to IS  Associations:   Intact  Attention/Concentration:   Intact  Orientation:    Alert and oriented x4  Memory:   Intact  Fund of Knowledge:    Appropriate for age and education  Insight/Judgement:   Intact/intact      PHQ-9 Questionaire 12/12/2022 11/16/2022   Little interest or pleasure in doing things 1 2   Feeling down, depressed, or hopeless 1 1   Trouble falling or staying asleep, or sleeping too much 2 2   Feeling tired or having little energy 1 1   Poor appetite or overeating 1 2   Feeling bad about yourself - or that you are a failure or have let yourself or your family down 1 1   Trouble concentrating on things, such as reading the newspaper or watching television 2 3   Moving or speaking so slowly that other people could have noticed. Or the opposite - being so fidgety or restless that you have been moving around a lot more than usual 1 3   Thoughts that you would be better off dead, or of hurting yourself in some way 0 0   PHQ-9 Total Score 10 15   If you checked off any problems, how difficult have these problems made it for you to do your work, take care of things at home, or get along with other people? - -     MOHAN-7 SCREENING 12/12/2022 11/16/2022   Feeling nervous, anxious, or on edge More than half the days More than half the days   Not being able to stop or control worrying Several days Several days   Worrying too much about different things Several days Several days   Trouble relaxing Several days More than half the days   Being so restless that it is hard to sit still More than half the days More than half the days   Becoming easily annoyed or irritable Several days Several days   Feeling afraid as if something awful might happen Several days More than half the days   MOHAN-7 Total Score 9 11   How difficult have these problems made it for you to do your work, take care of things at home, or get along with other people?  - -        Labs:     Admission on 12/07/2022, Discharged on 12/07/2022 Component Date Value Ref Range Status    Rapid Strep A Screen 12/07/2022 Negative  Negative Final    Comment: A culture confirmation plate has been set up and a separate  report will follow. See micro report.       Strep A Culture 12/07/2022 Normal oral ami, No Beta Strep isolated   Final       EKG: None on file        Jim Mitchell MD  Psychiatrist

## 2022-12-13 DIAGNOSIS — F31.62 BIPOLAR DISORDER, CURRENT EPISODE MIXED, MODERATE (HCC): ICD-10-CM

## 2022-12-14 ENCOUNTER — OFFICE VISIT (OUTPATIENT)
Dept: INTERNAL MEDICINE CLINIC | Age: 22
End: 2022-12-14
Payer: COMMERCIAL

## 2022-12-14 VITALS
HEART RATE: 85 BPM | BODY MASS INDEX: 24.92 KG/M2 | OXYGEN SATURATION: 95 % | DIASTOLIC BLOOD PRESSURE: 72 MMHG | SYSTOLIC BLOOD PRESSURE: 104 MMHG | WEIGHT: 145.2 LBS

## 2022-12-14 DIAGNOSIS — F90.0 ADHD, PREDOMINANTLY INATTENTIVE TYPE: ICD-10-CM

## 2022-12-14 DIAGNOSIS — Z00.00 ENCOUNTER FOR WELL ADULT EXAM WITHOUT ABNORMAL FINDINGS: Primary | ICD-10-CM

## 2022-12-14 DIAGNOSIS — F31.62 BIPOLAR DISORDER, CURRENT EPISODE MIXED, MODERATE (HCC): ICD-10-CM

## 2022-12-14 DIAGNOSIS — M54.17 LUMBOSACRAL RADICULOPATHY: ICD-10-CM

## 2022-12-14 LAB
AMPHETAMINE SCREEN, URINE: POSITIVE
BARBITURATE SCREEN URINE: ABNORMAL
BENZODIAZEPINE SCREEN, URINE: ABNORMAL
CANNABINOID SCREEN URINE: POSITIVE
COCAINE METABOLITE SCREEN URINE: ABNORMAL
FENTANYL SCREEN, URINE: ABNORMAL
HCT VFR BLD CALC: 40.6 % (ref 36–48)
HEMOGLOBIN: 13.3 G/DL (ref 12–16)
Lab: ABNORMAL
MCH RBC QN AUTO: 31.6 PG (ref 26–34)
MCHC RBC AUTO-ENTMCNC: 32.7 G/DL (ref 31–36)
MCV RBC AUTO: 96.8 FL (ref 80–100)
METHADONE SCREEN, URINE: ABNORMAL
OPIATE SCREEN URINE: ABNORMAL
OXYCODONE URINE: ABNORMAL
PDW BLD-RTO: 12.2 % (ref 12.4–15.4)
PH UA: 6
PHENCYCLIDINE SCREEN URINE: ABNORMAL
PLATELET # BLD: 350 K/UL (ref 135–450)
PMV BLD AUTO: 8.7 FL (ref 5–10.5)
RBC # BLD: 4.19 M/UL (ref 4–5.2)
TSH SERPL DL<=0.05 MIU/L-ACNC: 3.96 UIU/ML (ref 0.27–4.2)
WBC # BLD: 12.9 K/UL (ref 4–11)

## 2022-12-14 PROCEDURE — 99395 PREV VISIT EST AGE 18-39: CPT | Performed by: INTERNAL MEDICINE

## 2022-12-14 PROCEDURE — G8427 DOCREV CUR MEDS BY ELIG CLIN: HCPCS | Performed by: INTERNAL MEDICINE

## 2022-12-14 PROCEDURE — 99213 OFFICE O/P EST LOW 20 MIN: CPT | Performed by: INTERNAL MEDICINE

## 2022-12-14 PROCEDURE — 90674 CCIIV4 VAC NO PRSV 0.5 ML IM: CPT | Performed by: INTERNAL MEDICINE

## 2022-12-14 PROCEDURE — 1036F TOBACCO NON-USER: CPT | Performed by: INTERNAL MEDICINE

## 2022-12-14 PROCEDURE — G8482 FLU IMMUNIZE ORDER/ADMIN: HCPCS | Performed by: INTERNAL MEDICINE

## 2022-12-14 PROCEDURE — G8420 CALC BMI NORM PARAMETERS: HCPCS | Performed by: INTERNAL MEDICINE

## 2022-12-14 PROCEDURE — 90471 IMMUNIZATION ADMIN: CPT | Performed by: INTERNAL MEDICINE

## 2022-12-14 RX ORDER — OLANZAPINE 10 MG/1
10 TABLET ORAL NIGHTLY
Qty: 30 TABLET | Refills: 0 | Status: SHIPPED | OUTPATIENT
Start: 2022-12-14 | End: 2022-12-31 | Stop reason: SDUPTHER

## 2022-12-14 RX ORDER — ALBUTEROL SULFATE 90 UG/1
2 AEROSOL, METERED RESPIRATORY (INHALATION) EVERY 6 HOURS PRN
Qty: 18 G | Refills: 0 | Status: SHIPPED | OUTPATIENT
Start: 2022-12-14

## 2022-12-14 SDOH — ECONOMIC STABILITY: FOOD INSECURITY: WITHIN THE PAST 12 MONTHS, THE FOOD YOU BOUGHT JUST DIDN'T LAST AND YOU DIDN'T HAVE MONEY TO GET MORE.: NEVER TRUE

## 2022-12-14 SDOH — ECONOMIC STABILITY: FOOD INSECURITY: WITHIN THE PAST 12 MONTHS, YOU WORRIED THAT YOUR FOOD WOULD RUN OUT BEFORE YOU GOT MONEY TO BUY MORE.: NEVER TRUE

## 2022-12-14 ASSESSMENT — ENCOUNTER SYMPTOMS
ABDOMINAL PAIN: 0
DIARRHEA: 0
COUGH: 0
SHORTNESS OF BREATH: 0
TROUBLE SWALLOWING: 0
RHINORRHEA: 0
BACK PAIN: 1
NAUSEA: 0
SORE THROAT: 1

## 2022-12-14 ASSESSMENT — SOCIAL DETERMINANTS OF HEALTH (SDOH): HOW HARD IS IT FOR YOU TO PAY FOR THE VERY BASICS LIKE FOOD, HOUSING, MEDICAL CARE, AND HEATING?: NOT HARD AT ALL

## 2022-12-14 NOTE — PATIENT INSTRUCTIONS
covid booster at pharmacy    Flu shot today    Tsh with next labs    Try to get into swimming    Dr Marquis Baum back pain - lidocaine ointment, diclofenac gel    Add flonase      Well Visit, Ages 25 to 72: Care Instructions  Well visits can help you stay healthy. Your doctor has checked your overall health and may have suggested ways to take good care of yourself. Your doctor also may have recommended tests. You can help prevent illness with healthy eating, good sleep, vaccinations, regular exercise, and other steps. Get the tests that you and your doctor decide on. Depending on your age and risks, examples might include screening for diabetes; hepatitis C; HIV; and cervical, breast, lung, and colon cancer. Screening helps find diseases before any symptoms appear. Eat healthy foods. Choose fruits, vegetables, whole grains, lean protein, and low-fat dairy foods. Limit saturated fat and reduce salt. Limit alcohol. Men should have no more than 2 drinks a day. Women should have no more than 1. For some people, no alcohol is the best choice. Exercise. Get at least 30 minutes of exercise on most days of the week. Walking can be a good choice. Reach and stay at your healthy weight. This will lower your risk for many health problems. Take care of your mental health. Try to stay connected with friends, family, and community, and find ways to manage stress. If you're feeling depressed or hopeless, talk to someone. A counselor can help. If you don't have a counselor, talk to your doctor. Talk to your doctor if you think you may have a problem with alcohol or drug use. This includes prescription medicines and illegal drugs. Avoid tobacco and nicotine: Don't smoke, vape, or chew. If you need help quitting, talk to your doctor. Practice safer sex. Getting tested, using condoms or dental dams, and limiting sex partners can help prevent STIs.      Use birth control if it's important to you to prevent pregnancy. Talk with your doctor about your choices and what might be best for you. Prevent problems where you can. Protect your skin from too much sun, wash your hands, brush your teeth twice a day, and wear a seat belt in the car. Where can you learn more? Go to http://www.langford.com/ and enter P072 to learn more about \"Well Visit, Ages 25 to 72: Care Instructions. \"  Current as of: March 9, 2022               Content Version: 13.5  © 3406-7167 [employer]. Care instructions adapted under license by [facility] ([facility]). If you have questions about a medical condition or this instruction, always ask your healthcare professional. [address] any warranty or liability for your use of this information.

## 2022-12-14 NOTE — TELEPHONE ENCOUNTER
Medication:   Requested Prescriptions     Pending Prescriptions Disp Refills    OLANZapine (ZYPREXA) 10 MG tablet 30 tablet 2     Sig: Take 1 tablet by mouth nightly        Last Filled:  11/29/22    Patient Phone Number: [phone] (home)     Last appt: 12/12/2022   Next appt: 1/26/2023    Last OARRS: No flowsheet data found.

## 2022-12-14 NOTE — PROGRESS NOTES
Well Adult Note  Name: Stephanie Dozier Date: 2022   MRN: [id number] Sex: Female   Age: 23 y.o. Ethnicity: Non- / Non    : 2000 Race: White (non-)      Michelle Phillips is here for well adult exam.  History:    Patient is here for routine annual visit. Overall she feels her mental health is much improved on current medications. She is doing well in school. She is planning to change her major to criminal justice or psychology from [employer] as these are now her stronger interests. She tries to workout every day including heavy lifting. She has back and hip pain which she has had for at least several years. She was previously evaluated by physical medicine and rehab and had lumbar MRI. She states they are acting up on her again.   1. Grade 1 anterolisthesis of L5 on S1 secondary to L5 pars defects, with resultant moderate left   and mild right L5-S1 neural foraminal narrowing. Lack of soft tissue or marrow edema suggests this   is not an acute or recent finding. 2.  Mild L4-L5 and L5-S1 degenerative disc disease. She complains that her ears feel plugged up and she is concerned since she is going on an airplane soon. Last week she also had a sore throat and nasal congestion but this is improving. Review of Systems   Constitutional:  Negative for fatigue and fever. HENT:  Positive for congestion, ear pain and sore throat. Negative for rhinorrhea and trouble swallowing. Eyes:  Negative for visual disturbance. Respiratory:  Negative for cough and shortness of breath. Cardiovascular:  Negative for chest pain and palpitations. Gastrointestinal:  Negative for abdominal pain, diarrhea and nausea. Genitourinary:  Negative for decreased urine volume, dysuria and frequency. Musculoskeletal:  Positive for back pain. Negative for arthralgias and myalgias. Skin:  Negative for rash. Allergic/Immunologic: Negative for immunocompromised state. Neurological:  Negative for dizziness, numbness and headaches. Hematological:  Does not bruise/bleed easily. Psychiatric/Behavioral:  Positive for dysphoric mood. Negative for sleep disturbance. The patient is not nervous/anxious. Allergies   Allergen Reactions    Zithromax [Azithromycin] Anaphylaxis and Rash    Prozac [Fluoxetine]      Suicidal thoughts         Prior to Visit Medications    Medication Sig Taking?  Authorizing Provider   albuterol sulfate HFA (PROVENTIL;VENTOLIN;PROAIR) 108 (90 Base) MCG/ACT inhaler Inhale 2 puffs into the lungs every 6 hours as needed for Wheezing Yes Pramod Mak MD   amphetamine-dextroamphetamine (ADDERALL, 15MG,) 15 MG tablet Take 2 tablets every morning and 1 tablet every early afternoon (before 3pm) Yes Jeri Velez MD   OLANZapine (ZYPREXA) 5 MG tablet Take 0.5 tablets by mouth nightly Yes Jeri Velez MD   lithium 300 MG tablet Take 1 tablet in the AM and 2 tablets every evening for a total of 900mg daily Yes Jeri Velez MD   OLANZapine (ZYPREXA) 10 MG tablet Take 1 tablet by mouth nightly  Pramod Mak MD         Past Medical History:   Diagnosis Date    Acne     Anxiety and depression     Arthritis of right hip     Chorea     saw [facility] neurology    Dyslexia     has IEP    Family history of breast cancer     Low back pain     sp esiBilateral L5 spondylolysis with grade 1 spondylolisthesis    MTHFR mutation     heterozygous    Oligomenorrhea     probable pcos       Past Surgical History:   Procedure Laterality Date    TYMPANOSTOMY TUBE PLACEMENT           Family History   Problem Relation Age of Onset    Depression Mother         Undiagnosed    Depression Father         Seasonal, Sertraline    Heart Disease Paternal Grandfather     Suicide Paternal Cousin     ADHD Paternal Cousin        Social History     Tobacco Use    Smoking status: Never    Smokeless tobacco: Never   Substance Use Topics    Alcohol use: Yes     Comment: Social wine drinking    Drug use: Yes     Frequency: 1.0 times per week     Types: Marijuana Darrellruth Ordonez     Comment: Maximum during freshman year of college was 5 g daily. Currently 1 delta 8 every 3 weeks. Trialed cocaine 1 time, mushrooms 3 times       Objective   /72   Pulse 85   Wt 145 lb 3.2 oz (65.9 kg)   SpO2 95%   BMI 24.92 kg/m²   Wt Readings from Last 3 Encounters:   12/14/22 145 lb 3.2 oz (65.9 kg)   12/12/22 150 lb (68 kg)   11/16/22 147 lb (66.7 kg)     There were no vitals filed for this visit. Physical Exam  Vitals and nursing note reviewed. Constitutional:       General: She is not in acute distress. Appearance: She is well-developed. HENT:      Head: Normocephalic and atraumatic. Right Ear: External ear normal. There is impacted cerumen. Left Ear: External ear normal. There is impacted cerumen. Eyes:      General: No scleral icterus. Extraocular Movements: Extraocular movements intact. Neck:      Thyroid: No thyromegaly. Cardiovascular:      Rate and Rhythm: Normal rate and regular rhythm. Heart sounds: No murmur heard. Pulmonary:      Effort: No respiratory distress. Breath sounds: Normal breath sounds. No wheezing or rales. Abdominal:      General: Bowel sounds are normal. There is no distension. Palpations: Abdomen is soft. Tenderness: There is no abdominal tenderness. Musculoskeletal:         General: Normal range of motion. Lymphadenopathy:      Cervical: No cervical adenopathy. Skin:     General: Skin is warm and dry. Neurological:      Mental Status: She is alert and oriented to person, place, and time. Cranial Nerves: No cranial nerve deficit. Sensory: No sensory deficit. Coordination: Coordination normal.   Psychiatric:         Behavior: Behavior normal.     Cerumen removed by nurse. Assessment   Plan   1. Encounter for well adult exam without abnormal findings  Advised on recommended vaccines  2. Bipolar disorder, current episode mixed, moderate (HCC)  Stable on current medications. Established with Dr. Judith Urbano  -     TSH; Future  -     CBC; Future  3. Lumbosacral radiculopathy  Suggested patient might switch from weightlifting to swimming. Told her she can try some lidocaine ointment and diclofenac gel for her symptoms. Based on review of her lumbar MRI, told her she should transfer from [place] [address] to [place] and referral was placed.  Has HO ROSEMARY  -     AFL - Eros Mchugh MD, Neurosurgery (Spine, Brain Tumor), [facility]       Personalized Preventive Plan   Current Health Maintenance Status  Immunization History   Administered Date(s) Administered    COVID-19, PFIZER PURPLE top, DILUTE for use, (age 15 y+), 30mcg/0.3mL 03/31/2021, 04/21/2021, 11/30/2021    DTaP 2002, 01/07/2003, 03/03/2003, 03/16/2004, 10/05/2006    HPV 9-valent Azam Milo) 08/12/2016, 03/18/2017    Hepatitis A Ped/Adol (Havrix, Vaqta) 09/19/2009, 10/29/2011    Hepatitis B 2000    Hepatitis B Ped/Adol (Engerix-B, Recombivax HB) 2002, 06/23/2003, 03/14/2005    Hepatitis B vaccine 11/07/2003    Hib vaccine 2002, 01/07/2003, 03/03/2003, 12/02/2003    Influenza A ([id number]) Vaccine PF IM 12/01/2009, 01/23/2010    Influenza, FLUCELVAX, (age 10 mo+), MDCK, PF, 0.5mL 11/10/2021, 12/14/2022    MMR 12/02/2003, 08/03/2007    Meningococcal Vac Of Unknown Formula And Unknown Serogroup 07/03/2014, 09/19/2019    Meningococcal, MCV4, Unspecifd Conjugate (A,C,Y and W-135) 09/19/2019    Pneumococcal Conjugate 13-valent (Nolan Haver) 2002, 03/03/2003, 07/07/2003, 09/09/2003    Polio IPV (IPOL) 2002, 01/07/2003, 03/16/2004, 10/05/2006    Tdap (Boostrix, Adacel) 09/29/2020    Varicella (Varivax) 12/02/2003, 08/03/2007        Health Maintenance   Topic Date Due    Chlamydia/GC screen  Never done    COVID-19 Vaccine (4 - Booster for Andrade Peter series) 01/25/2022    Depression Monitoring  12/12/2023 DTaP/Tdap/Td vaccine (7 - Td or Tdap) 09/29/2030    Hepatitis A vaccine  Completed    Hib vaccine  Completed    HPV vaccine  Completed    Varicella vaccine  Completed    Meningococcal (ACWY) vaccine  Completed    Flu vaccine  Completed    Pneumococcal 0-64 years Vaccine  Completed    Hepatitis C screen  Completed    HIV screen  Completed     Recommendations for Preventive Services Due: see orders and patient instructions/AVS.    Return in about 1 year (around 12/14/2023).

## 2022-12-15 DIAGNOSIS — F31.62 BIPOLAR DISORDER, CURRENT EPISODE MIXED, MODERATE (HCC): ICD-10-CM

## 2022-12-15 RX ORDER — OLANZAPINE 10 MG/1
10 TABLET ORAL NIGHTLY
Qty: 30 TABLET | Refills: 0 | OUTPATIENT
Start: 2022-12-15

## 2022-12-15 NOTE — TELEPHONE ENCOUNTER
Medication:   Requested Prescriptions     Pending Prescriptions Disp Refills    OLANZapine (ZYPREXA) 10 MG tablet 30 tablet 0     Sig: Take 1 tablet by mouth nightly        Last Filled:  12/14/22    Patient Phone Number: [phone] (home)     Last appt: 12/12/2022   Next appt: 1/26/2023    Last OARRS: No flowsheet data found.

## 2022-12-19 NOTE — RESULT ENCOUNTER NOTE
Drug screen positive for amphetamines as expected. Cannabis positive to be discussed with the patient at the next office visit.

## 2022-12-31 DIAGNOSIS — F31.62 BIPOLAR DISORDER, CURRENT EPISODE MIXED, MODERATE (HCC): ICD-10-CM

## 2023-01-03 RX ORDER — OLANZAPINE 10 MG/1
10 TABLET ORAL NIGHTLY
Qty: 30 TABLET | Refills: 2 | Status: SHIPPED | OUTPATIENT
Start: 2023-01-03 | End: 2023-02-05 | Stop reason: SDUPTHER

## 2023-01-07 DIAGNOSIS — F90.0 ADHD, PREDOMINANTLY INATTENTIVE TYPE: ICD-10-CM

## 2023-01-09 RX ORDER — DEXTROAMPHETAMINE SACCHARATE, AMPHETAMINE ASPARTATE, DEXTROAMPHETAMINE SULFATE AND AMPHETAMINE SULFATE 3.75; 3.75; 3.75; 3.75 MG/1; MG/1; MG/1; MG/1
TABLET ORAL
Qty: 90 TABLET | Refills: 0 | Status: SHIPPED | OUTPATIENT
Start: 2023-01-09 | End: 2023-02-06

## 2023-02-03 NOTE — PROGRESS NOTES
PSYCHIATRY PROGRESS NOTE    Lizeth Ojeda  2000  02/06/23  Face to Face time: 30 minutes  PCP: Jl Roach MD    CC:   Chief Complaint   Patient presents with    Follow-up     Patient is a 81-year-old female with significant past medical history of spondylosis and choreiform movements who presents to the outpatient psychiatric clinic today for evaluation of depression, anxiety, and manic behaviors. A:  Patient's presentation today is indicative of ongoing difficulties with ADHD symptoms that are not as well-controlled today as they could be. In addition, the patient continues to have difficulties with mood instabilities that are likely related to her previously diagnosed bipolar disorder. We will continue to provide ongoing support. Diagnosis:  Bipolar 1 disorder, current episode moderate mixed symptoms  PTSD  ADHD inattentive subtype predominant  Social anxiety disorder  Cannabis use disorder    P:   1. We will plan to switch the patient over to Adderall Exar 20 mg daily for treatment of ADHD concerns. Patient was cautioned regarding adverse effects this medication as had described to her previously. 2.  We will plan to continue the patient's current medications of lithium 300 mg every morning/600 mg every afternoon as well as olanzapine 10 mg nightly +2.5 mg as needed    Medication Monitoring:    - PDMP reviewed: Adderall IR 22.5 mg twice daily 30-day supply last filled 1/11/2023    Follow-up: 4 weeks    Safety: Pt was counseled on the potential for increased suicidal ideations and advised on potential options for dealing with these including hotlines, calling the office, or going to the nearest emergency room. __________________________________________________________________________    S:   Patient endorsed that she is having low bit of a struggle with recent sleep schedules. She noted that she has \"mixed my days and nights have just a little bit\".   She notes that her current sleep schedule has her up until about 3 AM and then she will sleep sometimes until noon. Some of this is broken by her attempts to wake up in the morning in order to take her medications. She notes that she will feel some additional benefit from the Adderall, however she tends to take most of it in the morning and then finds that there is a crash in the afternoon. She gets about 3 hours worth of coverage the way that she currently takes it. She identified she currently has significant difficulties with completing tasks. Patient denied any SI/HI/AVH on evaluation today. ROS:  Review of Systems   Constitutional: Negative. HENT: Negative. Eyes: Negative. Respiratory: Negative. Cardiovascular: Negative. Gastrointestinal: Negative. Endocrine: Negative. Genitourinary: Negative. Musculoskeletal: Negative. Skin: Negative. Allergic/Immunologic: Negative. Neurological: Negative. Hematological: Negative. Psychiatric/Behavioral:  Positive for decreased concentration and dysphoric mood. The patient is nervous/anxious.        Brief Medical Hx:   Patient Active Problem List   Diagnosis    PTSD (post-traumatic stress disorder)    Bipolar disorder, current episode mixed, moderate (HCC)    Social anxiety disorder    ADHD, predominantly inattentive type        Brief Psych Hx:  Hosp: Denied  Diagnoses: Major depression  Med trials: Fluoxetine, trazodone, bupropion, sertraline  Outpt: History of multiple therapists however none currently  NSSI: Burning (lighter/pan)  Suicide Attempts: Denied    O:  Wt Readings from Last 3 Encounters:   02/06/23 150 lb (68 kg)   12/14/22 145 lb 3.2 oz (65.9 kg)   12/12/22 150 lb (68 kg)       Vitals:    02/06/23 1516   BP: 110/72   Pulse: 71   SpO2: 96%   Weight: 150 lb (68 kg)       Mental Status Exam:   Appearance:    Appropriately dressed  Motor: Significant fidgeting noted  Eye Contact: Fair to good  Speech:    Appropriate rate and rhythm  Language: Appropriate diction  Mood/Affect:  \" Up and down a bit\"/generally full range  Thought Process:   Some mild tangentiality but generally linear, logical  Thought Content:    Topic-appropriate, no SI/HI  Hallucinations:   Denied, not seem to be responding to internal stimuli  Associations:   Intact  Attention/Concentration:   Intact  Orientation:    Alert and oriented x4  Memory:   Intact  Fund of Knowledge:    Appropriate for age and education  Insight/Judgement:   Intact/intact      PHQ-9 Questionaire 12/12/2022 11/16/2022   Little interest or pleasure in doing things 1 2   Feeling down, depressed, or hopeless 1 1   Trouble falling or staying asleep, or sleeping too much 2 2   Feeling tired or having little energy 1 1   Poor appetite or overeating 1 2   Feeling bad about yourself - or that you are a failure or have let yourself or your family down 1 1   Trouble concentrating on things, such as reading the newspaper or watching television 2 3   Moving or speaking so slowly that other people could have noticed.  Or the opposite - being so fidgety or restless that you have been moving around a lot more than usual 1 3   Thoughts that you would be better off dead, or of hurting yourself in some way 0 0   PHQ-9 Total Score 10 15   If you checked off any problems, how difficult have these problems made it for you to do your work, take care of things at home, or get along with other people? - -     MOHAN-7 SCREENING 12/12/2022 11/16/2022   Feeling nervous, anxious, or on edge More than half the days More than half the days   Not being able to stop or control worrying Several days Several days   Worrying too much about different things Several days Several days   Trouble relaxing Several days More than half the days   Being so restless that it is hard to sit still More than half the days More than half the days   Becoming easily annoyed or irritable Several days Several days   Feeling afraid as if something awful might happen Several days More than half the days   MOHAN-7 Total Score 9 11   How difficult have these problems made it for you to do your work, take care of things at home, or get along with other people? - -        Labs:     Orders Only on 12/14/2022   Component Date Value Ref Range Status    Amphetamine Screen, Urine 12/14/2022 POSITIVE (A)  Negative <1000ng/mL Final    Comment: High concentrations of ephedrine/pseudoephedrine or  phenylpropanolamine may cause false positive results  for amphetamine. Therefore, confirmatory testing for  amphetamine should be considered if clinically indicated. Barbiturate Screen, Ur 12/14/2022 Neg  Negative <200 ng/mL Final    Benzodiazepine Screen, Urine 12/14/2022 Neg  Negative <200 ng/mL Final    Cannabinoid Scrn, Ur 12/14/2022 POSITIVE (A)  Negative <50 ng/mL Final    Cocaine Metabolite Screen, Urine 12/14/2022 Neg  Negative <300 ng/mL Final    Opiate Scrn, Ur 12/14/2022 Neg  Negative <300 ng/mL Final    Comment: \"Therapeutic levels of pain medication, especially oxycontin and synthetic  opioids, may not be detected by this Methodology. Pain management screen  panel  Drug panel-PM-Hi Res Ur, Interp (PAIN) should be considered for drug  monitoring \". PCP Screen, Urine 12/14/2022 Neg  Negative <25 ng/mL Final    Methadone Screen, Urine 12/14/2022 Neg  Negative <300 ng/mL Final    Oxycodone Urine 12/14/2022 Neg  Negative <100 ng/ml Final    FENTANYL SCREEN, URINE 12/14/2022 Neg  Negative <5 ng/mL Final    pH, UA 12/14/2022 6.0   Final    Comment: Urine pH less than 5.0 or greater than 8.0 may indicate sample adulteration. Another sample should be collected if clinically  indicated. Drug Screen Comment: 12/14/2022 see below   Final    Comment: This method is a screening test to detect only these drug  classes as part of a medical workup. Confirmatory testing  by another method should be ordered if clinically indicated.       WBC 12/14/2022 12.9 (A)  4.0 - 11.0 K/uL Final    RBC 12/14/2022 4.19  4.00 - 5.20 M/uL Final    Hemoglobin 12/14/2022 13.3  12.0 - 16.0 g/dL Final    Hematocrit 12/14/2022 40.6  36.0 - 48.0 % Final    MCV 12/14/2022 96.8  80.0 - 100.0 fL Final    MCH 12/14/2022 31.6  26.0 - 34.0 pg Final    MCHC 12/14/2022 32.7  31.0 - 36.0 g/dL Final    RDW 12/14/2022 12.2 (A)  12.4 - 15.4 % Final    Platelets 13/36/9096 350  135 - 450 K/uL Final    MPV 12/14/2022 8.7  5.0 - 10.5 fL Final    TSH 12/14/2022 3.96  0.27 - 4.20 uIU/mL Final       EKG: None on file        Abdullahi Guo MD  Psychiatrist

## 2023-02-05 DIAGNOSIS — F31.62 BIPOLAR DISORDER, CURRENT EPISODE MIXED, MODERATE (HCC): ICD-10-CM

## 2023-02-06 ENCOUNTER — OFFICE VISIT (OUTPATIENT)
Dept: PSYCHIATRY | Age: 23
End: 2023-02-06
Payer: COMMERCIAL

## 2023-02-06 VITALS
BODY MASS INDEX: 25.75 KG/M2 | HEART RATE: 71 BPM | OXYGEN SATURATION: 96 % | DIASTOLIC BLOOD PRESSURE: 72 MMHG | SYSTOLIC BLOOD PRESSURE: 110 MMHG | WEIGHT: 150 LBS

## 2023-02-06 DIAGNOSIS — F40.10 SOCIAL ANXIETY DISORDER: ICD-10-CM

## 2023-02-06 DIAGNOSIS — F43.10 PTSD (POST-TRAUMATIC STRESS DISORDER): ICD-10-CM

## 2023-02-06 DIAGNOSIS — F31.62 BIPOLAR DISORDER, CURRENT EPISODE MIXED, MODERATE (HCC): ICD-10-CM

## 2023-02-06 DIAGNOSIS — F90.0 ADHD, PREDOMINANTLY INATTENTIVE TYPE: Primary | ICD-10-CM

## 2023-02-06 PROCEDURE — 1036F TOBACCO NON-USER: CPT | Performed by: STUDENT IN AN ORGANIZED HEALTH CARE EDUCATION/TRAINING PROGRAM

## 2023-02-06 PROCEDURE — G8427 DOCREV CUR MEDS BY ELIG CLIN: HCPCS | Performed by: STUDENT IN AN ORGANIZED HEALTH CARE EDUCATION/TRAINING PROGRAM

## 2023-02-06 PROCEDURE — G8482 FLU IMMUNIZE ORDER/ADMIN: HCPCS | Performed by: STUDENT IN AN ORGANIZED HEALTH CARE EDUCATION/TRAINING PROGRAM

## 2023-02-06 PROCEDURE — 99214 OFFICE O/P EST MOD 30 MIN: CPT | Performed by: STUDENT IN AN ORGANIZED HEALTH CARE EDUCATION/TRAINING PROGRAM

## 2023-02-06 PROCEDURE — G8419 CALC BMI OUT NRM PARAM NOF/U: HCPCS | Performed by: STUDENT IN AN ORGANIZED HEALTH CARE EDUCATION/TRAINING PROGRAM

## 2023-02-06 RX ORDER — DEXTROAMPHETAMINE SACCHARATE, AMPHETAMINE ASPARTATE MONOHYDRATE, DEXTROAMPHETAMINE SULFATE AND AMPHETAMINE SULFATE 5; 5; 5; 5 MG/1; MG/1; MG/1; MG/1
20 CAPSULE, EXTENDED RELEASE ORAL EVERY MORNING
Qty: 14 CAPSULE | Refills: 0 | Status: SHIPPED | OUTPATIENT
Start: 2023-02-06 | End: 2023-02-23 | Stop reason: SDUPTHER

## 2023-02-06 RX ORDER — LITHIUM CARBONATE 300 MG
TABLET ORAL
Qty: 90 TABLET | Refills: 2 | Status: SHIPPED | OUTPATIENT
Start: 2023-02-06

## 2023-02-06 RX ORDER — OLANZAPINE 10 MG/1
10 TABLET ORAL NIGHTLY
Qty: 30 TABLET | Refills: 2 | Status: SHIPPED | OUTPATIENT
Start: 2023-02-06

## 2023-02-06 NOTE — TELEPHONE ENCOUNTER
Medication:   Requested Prescriptions     Pending Prescriptions Disp Refills    OLANZapine (ZYPREXA) 10 MG tablet 30 tablet 2     Sig: Take 1 tablet by mouth nightly        Last Filled:  1/03/23    Patient Phone Number: [phone] (home)     Last appt: 12/12/2022   Next appt: 2/6/2023    Last OARRS: No flowsheet data found.

## 2023-02-23 DIAGNOSIS — F90.0 ADHD, PREDOMINANTLY INATTENTIVE TYPE: ICD-10-CM

## 2023-02-24 ENCOUNTER — TELEPHONE (OUTPATIENT)
Dept: INTERNAL MEDICINE CLINIC | Age: 23
End: 2023-02-24

## 2023-02-24 NOTE — TELEPHONE ENCOUNTER
----- Message from Elham Haines sent at 2/24/2023 12:31 PM EST -----  Subject: Message to Provider    QUESTIONS  Information for Provider? Patient said she called yesterday for a refill   and today it says staffed looked at message but she called pharmacy and   there was nothing filled yet. Patient said she does not get any alerts on   her MyChart when her prescriptions are filled   ---------------------------------------------------------------------------  --------------  CALL BACK INFO  [phone]; OK to leave message on voicemail  ---------------------------------------------------------------------------  --------------  SCRIPT ANSWERS  Relationship to Patient? Self

## 2023-02-27 RX ORDER — DEXTROAMPHETAMINE SACCHARATE, AMPHETAMINE ASPARTATE MONOHYDRATE, DEXTROAMPHETAMINE SULFATE AND AMPHETAMINE SULFATE 5; 5; 5; 5 MG/1; MG/1; MG/1; MG/1
20 CAPSULE, EXTENDED RELEASE ORAL EVERY MORNING
Qty: 14 CAPSULE | Refills: 0 | Status: SHIPPED | OUTPATIENT
Start: 2023-02-27 | End: 2023-03-06 | Stop reason: SDUPTHER

## 2023-03-06 DIAGNOSIS — F31.62 BIPOLAR DISORDER, CURRENT EPISODE MIXED, MODERATE (HCC): ICD-10-CM

## 2023-03-06 RX ORDER — OLANZAPINE 10 MG/1
10 TABLET ORAL NIGHTLY
Qty: 30 TABLET | Refills: 2 | Status: SHIPPED | OUTPATIENT
Start: 2023-03-06

## 2023-03-06 ASSESSMENT — ENCOUNTER SYMPTOMS
ALLERGIC/IMMUNOLOGIC NEGATIVE: 1
RESPIRATORY NEGATIVE: 1
GASTROINTESTINAL NEGATIVE: 1
EYES NEGATIVE: 1

## 2023-03-08 NOTE — PROGRESS NOTES
Ur 12/14/2022 Neg  Negative <200 ng/mL Final    Benzodiazepine Screen, Urine 12/14/2022 Neg  Negative <200 ng/mL Final    Cannabinoid Scrn, Ur 12/14/2022 POSITIVE (A)  Negative <50 ng/mL Final    Cocaine Metabolite Screen, Urine 12/14/2022 Neg  Negative <300 ng/mL Final    Opiate Scrn, Ur 12/14/2022 Neg  Negative <300 ng/mL Final    Comment: \"Therapeutic levels of pain medication, especially oxycontin and synthetic  opioids, may not be detected by this Methodology. Pain management screen  panel  Drug panel-PM-Hi Res Ur, Interp (PAIN) should be considered for drug  monitoring \". PCP Screen, Urine 12/14/2022 Neg  Negative <25 ng/mL Final    Methadone Screen, Urine 12/14/2022 Neg  Negative <300 ng/mL Final    Oxycodone Urine 12/14/2022 Neg  Negative <100 ng/ml Final    FENTANYL SCREEN, URINE 12/14/2022 Neg  Negative <5 ng/mL Final    pH, UA 12/14/2022 6.0   Final    Comment: Urine pH less than 5.0 or greater than 8.0 may indicate sample adulteration. Another sample should be collected if clinically  indicated. Drug Screen Comment: 12/14/2022 see below   Final    Comment: This method is a screening test to detect only these drug  classes as part of a medical workup. Confirmatory testing  by another method should be ordered if clinically indicated.       WBC 12/14/2022 12.9 (H)  4.0 - 11.0 K/uL Final    RBC 12/14/2022 4.19  4.00 - 5.20 M/uL Final    Hemoglobin 12/14/2022 13.3  12.0 - 16.0 g/dL Final    Hematocrit 12/14/2022 40.6  36.0 - 48.0 % Final    MCV 12/14/2022 96.8  80.0 - 100.0 fL Final    MCH 12/14/2022 31.6  26.0 - 34.0 pg Final    MCHC 12/14/2022 32.7  31.0 - 36.0 g/dL Final    RDW 12/14/2022 12.2 (L)  12.4 - 15.4 % Final    Platelets 03/27/4192 350  135 - 450 K/uL Final    MPV 12/14/2022 8.7  5.0 - 10.5 fL Final    TSH 12/14/2022 3.96  0.27 - 4.20 uIU/mL Final       EKG: None on file        Cheyenne Davis MD  Psychiatrist

## 2023-03-09 ENCOUNTER — OFFICE VISIT (OUTPATIENT)
Dept: PSYCHIATRY | Age: 23
End: 2023-03-09
Payer: COMMERCIAL

## 2023-03-09 VITALS
DIASTOLIC BLOOD PRESSURE: 70 MMHG | OXYGEN SATURATION: 96 % | WEIGHT: 159 LBS | BODY MASS INDEX: 27.29 KG/M2 | HEART RATE: 85 BPM | SYSTOLIC BLOOD PRESSURE: 106 MMHG

## 2023-03-09 DIAGNOSIS — F31.62 BIPOLAR DISORDER, CURRENT EPISODE MIXED, MODERATE (HCC): ICD-10-CM

## 2023-03-09 DIAGNOSIS — F40.10 SOCIAL ANXIETY DISORDER: ICD-10-CM

## 2023-03-09 DIAGNOSIS — F90.0 ADHD, PREDOMINANTLY INATTENTIVE TYPE: Primary | ICD-10-CM

## 2023-03-09 DIAGNOSIS — F43.10 PTSD (POST-TRAUMATIC STRESS DISORDER): ICD-10-CM

## 2023-03-09 PROCEDURE — 99214 OFFICE O/P EST MOD 30 MIN: CPT | Performed by: STUDENT IN AN ORGANIZED HEALTH CARE EDUCATION/TRAINING PROGRAM

## 2023-03-09 PROCEDURE — G8482 FLU IMMUNIZE ORDER/ADMIN: HCPCS | Performed by: STUDENT IN AN ORGANIZED HEALTH CARE EDUCATION/TRAINING PROGRAM

## 2023-03-09 PROCEDURE — 1036F TOBACCO NON-USER: CPT | Performed by: STUDENT IN AN ORGANIZED HEALTH CARE EDUCATION/TRAINING PROGRAM

## 2023-03-09 PROCEDURE — G8419 CALC BMI OUT NRM PARAM NOF/U: HCPCS | Performed by: STUDENT IN AN ORGANIZED HEALTH CARE EDUCATION/TRAINING PROGRAM

## 2023-03-09 PROCEDURE — G8427 DOCREV CUR MEDS BY ELIG CLIN: HCPCS | Performed by: STUDENT IN AN ORGANIZED HEALTH CARE EDUCATION/TRAINING PROGRAM

## 2023-03-09 ASSESSMENT — PATIENT HEALTH QUESTIONNAIRE - PHQ9
SUM OF ALL RESPONSES TO PHQ QUESTIONS 1-9: 13
2. FEELING DOWN, DEPRESSED OR HOPELESS: 1
5. POOR APPETITE OR OVEREATING: 2
1. LITTLE INTEREST OR PLEASURE IN DOING THINGS: 1
SUM OF ALL RESPONSES TO PHQ QUESTIONS 1-9: 13
7. TROUBLE CONCENTRATING ON THINGS, SUCH AS READING THE NEWSPAPER OR WATCHING TELEVISION: 3
10. IF YOU CHECKED OFF ANY PROBLEMS, HOW DIFFICULT HAVE THESE PROBLEMS MADE IT FOR YOU TO DO YOUR WORK, TAKE CARE OF THINGS AT HOME, OR GET ALONG WITH OTHER PEOPLE: 1
SUM OF ALL RESPONSES TO PHQ9 QUESTIONS 1 & 2: 2
SUM OF ALL RESPONSES TO PHQ QUESTIONS 1-9: 13
9. THOUGHTS THAT YOU WOULD BE BETTER OFF DEAD, OR OF HURTING YOURSELF: 0
3. TROUBLE FALLING OR STAYING ASLEEP: 2
6. FEELING BAD ABOUT YOURSELF - OR THAT YOU ARE A FAILURE OR HAVE LET YOURSELF OR YOUR FAMILY DOWN: 1
8. MOVING OR SPEAKING SO SLOWLY THAT OTHER PEOPLE COULD HAVE NOTICED. OR THE OPPOSITE, BEING SO FIGETY OR RESTLESS THAT YOU HAVE BEEN MOVING AROUND A LOT MORE THAN USUAL: 2
4. FEELING TIRED OR HAVING LITTLE ENERGY: 1
SUM OF ALL RESPONSES TO PHQ QUESTIONS 1-9: 13

## 2023-03-09 ASSESSMENT — ANXIETY QUESTIONNAIRES
2. NOT BEING ABLE TO STOP OR CONTROL WORRYING: 1
6. BECOMING EASILY ANNOYED OR IRRITABLE: 1
3. WORRYING TOO MUCH ABOUT DIFFERENT THINGS: 1
GAD7 TOTAL SCORE: 8
5. BEING SO RESTLESS THAT IT IS HARD TO SIT STILL: 1
1. FEELING NERVOUS, ANXIOUS, OR ON EDGE: 1
IF YOU CHECKED OFF ANY PROBLEMS ON THIS QUESTIONNAIRE, HOW DIFFICULT HAVE THESE PROBLEMS MADE IT FOR YOU TO DO YOUR WORK, TAKE CARE OF THINGS AT HOME, OR GET ALONG WITH OTHER PEOPLE: SOMEWHAT DIFFICULT
4. TROUBLE RELAXING: 2
7. FEELING AFRAID AS IF SOMETHING AWFUL MIGHT HAPPEN: 1

## 2023-03-27 DIAGNOSIS — F90.0 ADHD, PREDOMINANTLY INATTENTIVE TYPE: ICD-10-CM

## 2023-03-28 RX ORDER — DEXTROAMPHETAMINE SACCHARATE, AMPHETAMINE ASPARTATE MONOHYDRATE, DEXTROAMPHETAMINE SULFATE AND AMPHETAMINE SULFATE 5; 5; 5; 5 MG/1; MG/1; MG/1; MG/1
20 CAPSULE, EXTENDED RELEASE ORAL EVERY MORNING
Qty: 14 CAPSULE | Refills: 0 | Status: SHIPPED | OUTPATIENT
Start: 2023-03-28 | End: 2023-04-09 | Stop reason: SDUPTHER

## 2023-04-05 ASSESSMENT — ENCOUNTER SYMPTOMS
GASTROINTESTINAL NEGATIVE: 1
RESPIRATORY NEGATIVE: 1
ALLERGIC/IMMUNOLOGIC NEGATIVE: 1
EYES NEGATIVE: 1

## 2023-04-09 DIAGNOSIS — F90.0 ADHD, PREDOMINANTLY INATTENTIVE TYPE: ICD-10-CM

## 2023-04-12 RX ORDER — DEXTROAMPHETAMINE SACCHARATE, AMPHETAMINE ASPARTATE MONOHYDRATE, DEXTROAMPHETAMINE SULFATE AND AMPHETAMINE SULFATE 5; 5; 5; 5 MG/1; MG/1; MG/1; MG/1
20 CAPSULE, EXTENDED RELEASE ORAL EVERY MORNING
Qty: 14 CAPSULE | Refills: 0 | Status: SHIPPED | OUTPATIENT
Start: 2023-04-12 | End: 2023-05-17 | Stop reason: SDUPTHER

## 2023-04-17 ENCOUNTER — OFFICE VISIT (OUTPATIENT)
Dept: PSYCHIATRY | Age: 23
End: 2023-04-17
Payer: COMMERCIAL

## 2023-04-17 VITALS
SYSTOLIC BLOOD PRESSURE: 120 MMHG | BODY MASS INDEX: 26.4 KG/M2 | HEART RATE: 66 BPM | DIASTOLIC BLOOD PRESSURE: 82 MMHG | OXYGEN SATURATION: 97 % | WEIGHT: 153.8 LBS

## 2023-04-17 DIAGNOSIS — F40.10 SOCIAL ANXIETY DISORDER: ICD-10-CM

## 2023-04-17 DIAGNOSIS — F90.0 ADHD, PREDOMINANTLY INATTENTIVE TYPE: Primary | ICD-10-CM

## 2023-04-17 DIAGNOSIS — F43.10 PTSD (POST-TRAUMATIC STRESS DISORDER): ICD-10-CM

## 2023-04-17 PROCEDURE — 1036F TOBACCO NON-USER: CPT | Performed by: STUDENT IN AN ORGANIZED HEALTH CARE EDUCATION/TRAINING PROGRAM

## 2023-04-17 PROCEDURE — G8419 CALC BMI OUT NRM PARAM NOF/U: HCPCS | Performed by: STUDENT IN AN ORGANIZED HEALTH CARE EDUCATION/TRAINING PROGRAM

## 2023-04-17 PROCEDURE — 99214 OFFICE O/P EST MOD 30 MIN: CPT | Performed by: STUDENT IN AN ORGANIZED HEALTH CARE EDUCATION/TRAINING PROGRAM

## 2023-04-17 PROCEDURE — G8427 DOCREV CUR MEDS BY ELIG CLIN: HCPCS | Performed by: STUDENT IN AN ORGANIZED HEALTH CARE EDUCATION/TRAINING PROGRAM

## 2023-04-17 RX ORDER — DEXTROAMPHETAMINE SACCHARATE, AMPHETAMINE ASPARTATE MONOHYDRATE, DEXTROAMPHETAMINE SULFATE AND AMPHETAMINE SULFATE 5; 5; 5; 5 MG/1; MG/1; MG/1; MG/1
20 CAPSULE, EXTENDED RELEASE ORAL EVERY MORNING
Qty: 30 CAPSULE | Refills: 0 | Status: SHIPPED | OUTPATIENT
Start: 2023-04-24 | End: 2023-05-24

## 2023-04-17 RX ORDER — DEXTROAMPHETAMINE SACCHARATE, AMPHETAMINE ASPARTATE, DEXTROAMPHETAMINE SULFATE AND AMPHETAMINE SULFATE 2.5; 2.5; 2.5; 2.5 MG/1; MG/1; MG/1; MG/1
10 TABLET ORAL DAILY
Qty: 30 TABLET | Refills: 0 | Status: SHIPPED | OUTPATIENT
Start: 2023-04-17 | End: 2023-05-17 | Stop reason: SDUPTHER

## 2023-04-17 ASSESSMENT — ANXIETY QUESTIONNAIRES
IF YOU CHECKED OFF ANY PROBLEMS ON THIS QUESTIONNAIRE, HOW DIFFICULT HAVE THESE PROBLEMS MADE IT FOR YOU TO DO YOUR WORK, TAKE CARE OF THINGS AT HOME, OR GET ALONG WITH OTHER PEOPLE: SOMEWHAT DIFFICULT
5. BEING SO RESTLESS THAT IT IS HARD TO SIT STILL: 2
3. WORRYING TOO MUCH ABOUT DIFFERENT THINGS: 1
2. NOT BEING ABLE TO STOP OR CONTROL WORRYING: 1
4. TROUBLE RELAXING: 2
1. FEELING NERVOUS, ANXIOUS, OR ON EDGE: 1
GAD7 TOTAL SCORE: 9
7. FEELING AFRAID AS IF SOMETHING AWFUL MIGHT HAPPEN: 1
6. BECOMING EASILY ANNOYED OR IRRITABLE: 1

## 2023-04-17 ASSESSMENT — PATIENT HEALTH QUESTIONNAIRE - PHQ9
SUM OF ALL RESPONSES TO PHQ9 QUESTIONS 1 & 2: 2
7. TROUBLE CONCENTRATING ON THINGS, SUCH AS READING THE NEWSPAPER OR WATCHING TELEVISION: 2
10. IF YOU CHECKED OFF ANY PROBLEMS, HOW DIFFICULT HAVE THESE PROBLEMS MADE IT FOR YOU TO DO YOUR WORK, TAKE CARE OF THINGS AT HOME, OR GET ALONG WITH OTHER PEOPLE: 1
2. FEELING DOWN, DEPRESSED OR HOPELESS: 1
5. POOR APPETITE OR OVEREATING: 2
SUM OF ALL RESPONSES TO PHQ QUESTIONS 1-9: 11
SUM OF ALL RESPONSES TO PHQ QUESTIONS 1-9: 11
1. LITTLE INTEREST OR PLEASURE IN DOING THINGS: 1
8. MOVING OR SPEAKING SO SLOWLY THAT OTHER PEOPLE COULD HAVE NOTICED. OR THE OPPOSITE, BEING SO FIGETY OR RESTLESS THAT YOU HAVE BEEN MOVING AROUND A LOT MORE THAN USUAL: 2
3. TROUBLE FALLING OR STAYING ASLEEP: 1
9. THOUGHTS THAT YOU WOULD BE BETTER OFF DEAD, OR OF HURTING YOURSELF: 0
4. FEELING TIRED OR HAVING LITTLE ENERGY: 1
SUM OF ALL RESPONSES TO PHQ QUESTIONS 1-9: 11
SUM OF ALL RESPONSES TO PHQ QUESTIONS 1-9: 11
6. FEELING BAD ABOUT YOURSELF - OR THAT YOU ARE A FAILURE OR HAVE LET YOURSELF OR YOUR FAMILY DOWN: 1

## 2023-04-25 DIAGNOSIS — F90.0 ADHD, PREDOMINANTLY INATTENTIVE TYPE: ICD-10-CM

## 2023-04-25 RX ORDER — DEXTROAMPHETAMINE SACCHARATE, AMPHETAMINE ASPARTATE MONOHYDRATE, DEXTROAMPHETAMINE SULFATE AND AMPHETAMINE SULFATE 5; 5; 5; 5 MG/1; MG/1; MG/1; MG/1
20 CAPSULE, EXTENDED RELEASE ORAL EVERY MORNING
Qty: 30 CAPSULE | Refills: 0 | Status: CANCELLED | OUTPATIENT
Start: 2023-04-25 | End: 2023-05-25

## 2023-04-26 DIAGNOSIS — F90.0 ADHD, PREDOMINANTLY INATTENTIVE TYPE: ICD-10-CM

## 2023-04-26 RX ORDER — DEXTROAMPHETAMINE SACCHARATE, AMPHETAMINE ASPARTATE MONOHYDRATE, DEXTROAMPHETAMINE SULFATE AND AMPHETAMINE SULFATE 5; 5; 5; 5 MG/1; MG/1; MG/1; MG/1
20 CAPSULE, EXTENDED RELEASE ORAL EVERY MORNING
Qty: 14 CAPSULE | Refills: 0 | OUTPATIENT
Start: 2023-04-26 | End: 2023-05-10

## 2023-04-27 ENCOUNTER — SCHEDULED TELEPHONE ENCOUNTER (OUTPATIENT)
Dept: INTERNAL MEDICINE CLINIC | Age: 23
End: 2023-04-27
Payer: COMMERCIAL

## 2023-04-27 ENCOUNTER — TELEPHONE (OUTPATIENT)
Dept: INTERNAL MEDICINE CLINIC | Age: 23
End: 2023-04-27

## 2023-04-27 DIAGNOSIS — F31.62 BIPOLAR DISORDER, CURRENT EPISODE MIXED, MODERATE (HCC): Primary | ICD-10-CM

## 2023-04-27 DIAGNOSIS — Z02.9 ADMINISTRATIVE ENCOUNTER: ICD-10-CM

## 2023-04-27 PROCEDURE — 99443 PR PHYS/QHP TELEPHONE EVALUATION 21-30 MIN: CPT | Performed by: INTERNAL MEDICINE

## 2023-04-27 NOTE — PROGRESS NOTES
Minerva Hanna is a 23 y.o. female evaluated via telephone on 4/27/2023 for No chief complaint on file. .        Documentation:  I communicated with the patient and/or health care decision maker about completing forms for overnight summer camp in [facility]. 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 Wanted to clarify that her mental health concerns were under reasonable control. Details of this discussion including any medical advice provided: form copied to chart. Total Time: minutes: 21-30 minutes    Minerva Hanna was evaluated through a synchronous (real-time) audio encounter. Patient identification was verified at the start of the visit. She (or guardian if applicable) is aware that this is a billable service, which includes applicable co-pays. This visit was conducted with the patient's (and/or legal guardian's) verbal consent. She has not had a related appointment within my department in the past 7 days or scheduled within the next 24 hours. The patient was located at Home: [facility] [address], [address] [address]. The provider was located at [facility] (Appt Dept): [address]     Note: not billable if this call serves to triage the patient into an appointment for the relevant concern    Bryanna Zelaya MD

## 2023-04-29 PROBLEM — Z02.9 ADMINISTRATIVE ENCOUNTER: Status: ACTIVE | Noted: 2023-04-29

## 2023-05-17 DIAGNOSIS — F31.62 BIPOLAR DISORDER, CURRENT EPISODE MIXED, MODERATE (HCC): ICD-10-CM

## 2023-05-18 RX ORDER — OLANZAPINE 10 MG/1
10 TABLET ORAL NIGHTLY
Qty: 90 TABLET | Refills: 1 | OUTPATIENT
Start: 2023-05-18

## 2023-05-18 RX ORDER — OLANZAPINE 5 MG/1
2.5 TABLET ORAL NIGHTLY
Qty: 30 TABLET | Refills: 2 | OUTPATIENT
Start: 2023-05-18

## 2023-05-24 ASSESSMENT — ENCOUNTER SYMPTOMS
GASTROINTESTINAL NEGATIVE: 1
ALLERGIC/IMMUNOLOGIC NEGATIVE: 1
EYES NEGATIVE: 1
RESPIRATORY NEGATIVE: 1

## 2023-05-25 ENCOUNTER — OFFICE VISIT (OUTPATIENT)
Dept: PSYCHIATRY | Age: 23
End: 2023-05-25
Payer: COMMERCIAL

## 2023-05-25 VITALS
SYSTOLIC BLOOD PRESSURE: 114 MMHG | HEART RATE: 92 BPM | BODY MASS INDEX: 26.29 KG/M2 | DIASTOLIC BLOOD PRESSURE: 70 MMHG | HEIGHT: 64 IN | WEIGHT: 154 LBS

## 2023-05-25 DIAGNOSIS — F90.0 ADHD, PREDOMINANTLY INATTENTIVE TYPE: Primary | ICD-10-CM

## 2023-05-25 DIAGNOSIS — F43.10 PTSD (POST-TRAUMATIC STRESS DISORDER): ICD-10-CM

## 2023-05-25 DIAGNOSIS — F40.10 SOCIAL ANXIETY DISORDER: ICD-10-CM

## 2023-05-25 DIAGNOSIS — F31.62 BIPOLAR DISORDER, CURRENT EPISODE MIXED, MODERATE (HCC): ICD-10-CM

## 2023-05-25 PROCEDURE — G8427 DOCREV CUR MEDS BY ELIG CLIN: HCPCS | Performed by: STUDENT IN AN ORGANIZED HEALTH CARE EDUCATION/TRAINING PROGRAM

## 2023-05-25 PROCEDURE — 1036F TOBACCO NON-USER: CPT | Performed by: STUDENT IN AN ORGANIZED HEALTH CARE EDUCATION/TRAINING PROGRAM

## 2023-05-25 PROCEDURE — G8419 CALC BMI OUT NRM PARAM NOF/U: HCPCS | Performed by: STUDENT IN AN ORGANIZED HEALTH CARE EDUCATION/TRAINING PROGRAM

## 2023-05-25 PROCEDURE — 99214 OFFICE O/P EST MOD 30 MIN: CPT | Performed by: STUDENT IN AN ORGANIZED HEALTH CARE EDUCATION/TRAINING PROGRAM

## 2023-05-25 RX ORDER — OLANZAPINE 5 MG/1
2.5 TABLET ORAL NIGHTLY PRN
Qty: 30 TABLET | Refills: 2 | Status: SHIPPED | OUTPATIENT
Start: 2023-05-25

## 2023-05-25 ASSESSMENT — PATIENT HEALTH QUESTIONNAIRE - PHQ9
5. POOR APPETITE OR OVEREATING: 2
SUM OF ALL RESPONSES TO PHQ QUESTIONS 1-9: 12
SUM OF ALL RESPONSES TO PHQ QUESTIONS 1-9: 12
7. TROUBLE CONCENTRATING ON THINGS, SUCH AS READING THE NEWSPAPER OR WATCHING TELEVISION: 2
9. THOUGHTS THAT YOU WOULD BE BETTER OFF DEAD, OR OF HURTING YOURSELF: 0
6. FEELING BAD ABOUT YOURSELF - OR THAT YOU ARE A FAILURE OR HAVE LET YOURSELF OR YOUR FAMILY DOWN: 1
SUM OF ALL RESPONSES TO PHQ9 QUESTIONS 1 & 2: 2
3. TROUBLE FALLING OR STAYING ASLEEP: 2
SUM OF ALL RESPONSES TO PHQ QUESTIONS 1-9: 12
4. FEELING TIRED OR HAVING LITTLE ENERGY: 1
8. MOVING OR SPEAKING SO SLOWLY THAT OTHER PEOPLE COULD HAVE NOTICED. OR THE OPPOSITE, BEING SO FIGETY OR RESTLESS THAT YOU HAVE BEEN MOVING AROUND A LOT MORE THAN USUAL: 2
2. FEELING DOWN, DEPRESSED OR HOPELESS: 1
1. LITTLE INTEREST OR PLEASURE IN DOING THINGS: 1
SUM OF ALL RESPONSES TO PHQ QUESTIONS 1-9: 12

## 2023-05-25 ASSESSMENT — ANXIETY QUESTIONNAIRES
GAD7 TOTAL SCORE: 9
5. BEING SO RESTLESS THAT IT IS HARD TO SIT STILL: 2
3. WORRYING TOO MUCH ABOUT DIFFERENT THINGS: 1
4. TROUBLE RELAXING: 2
2. NOT BEING ABLE TO STOP OR CONTROL WORRYING: 1
6. BECOMING EASILY ANNOYED OR IRRITABLE: 1
7. FEELING AFRAID AS IF SOMETHING AWFUL MIGHT HAPPEN: 1
1. FEELING NERVOUS, ANXIOUS, OR ON EDGE: 1

## 2023-05-25 ASSESSMENT — ENCOUNTER SYMPTOMS
GASTROINTESTINAL NEGATIVE: 1
EYES NEGATIVE: 1
RESPIRATORY NEGATIVE: 1
ALLERGIC/IMMUNOLOGIC NEGATIVE: 1

## 2023-05-25 NOTE — PROGRESS NOTES
Patient is here for a follow up states that she is doing ok, would like her Adderral refilled for a 90 day supply.      PHQ: 12  MOHAN: 9    Previous Scores from 4.17.23  PHQ: 11  MOHAN: 9
Final    pH, UA 12/14/2022 6.0   Final    Comment: Urine pH less than 5.0 or greater than 8.0 may indicate sample adulteration. Another sample should be collected if clinically  indicated. Drug Screen Comment: 12/14/2022 see below   Final    Comment: This method is a screening test to detect only these drug  classes as part of a medical workup. Confirmatory testing  by another method should be ordered if clinically indicated.       WBC 12/14/2022 12.9 (H)  4.0 - 11.0 K/uL Final    RBC 12/14/2022 4.19  4.00 - 5.20 M/uL Final    Hemoglobin 12/14/2022 13.3  12.0 - 16.0 g/dL Final    Hematocrit 12/14/2022 40.6  36.0 - 48.0 % Final    MCV 12/14/2022 96.8  80.0 - 100.0 fL Final    MCH 12/14/2022 31.6  26.0 - 34.0 pg Final    MCHC 12/14/2022 32.7  31.0 - 36.0 g/dL Final    RDW 12/14/2022 12.2 (L)  12.4 - 15.4 % Final    Platelets 97/18/4892 350  135 - 450 K/uL Final    MPV 12/14/2022 8.7  5.0 - 10.5 fL Final    TSH 12/14/2022 3.96  0.27 - 4.20 uIU/mL Final       EKG: None on file        Lico London MD  Psychiatrist

## 2023-05-30 ENCOUNTER — TELEPHONE (OUTPATIENT)
Dept: INTERNAL MEDICINE CLINIC | Age: 23
End: 2023-05-30

## 2023-05-30 DIAGNOSIS — F98.8 ATTENTION DEFICIT DISORDER, UNSPECIFIED HYPERACTIVITY PRESENCE: Primary | ICD-10-CM

## 2023-05-30 RX ORDER — DEXTROAMPHETAMINE SACCHARATE, AMPHETAMINE ASPARTATE MONOHYDRATE, DEXTROAMPHETAMINE SULFATE AND AMPHETAMINE SULFATE 2.5; 2.5; 2.5; 2.5 MG/1; MG/1; MG/1; MG/1
10 CAPSULE, EXTENDED RELEASE ORAL 2 TIMES DAILY
Qty: 120 CAPSULE | Refills: 0 | Status: SHIPPED | OUTPATIENT
Start: 2023-05-30 | End: 2023-07-29

## 2023-06-05 DIAGNOSIS — F31.62 BIPOLAR DISORDER, CURRENT EPISODE MIXED, MODERATE (HCC): ICD-10-CM

## 2023-06-06 RX ORDER — OLANZAPINE 5 MG/1
2.5 TABLET ORAL NIGHTLY PRN
Qty: 30 TABLET | Refills: 2 | OUTPATIENT
Start: 2023-06-06

## 2023-08-15 DIAGNOSIS — F98.8 ATTENTION DEFICIT DISORDER, UNSPECIFIED HYPERACTIVITY PRESENCE: ICD-10-CM

## 2023-08-16 RX ORDER — DEXTROAMPHETAMINE SACCHARATE, AMPHETAMINE ASPARTATE MONOHYDRATE, DEXTROAMPHETAMINE SULFATE AND AMPHETAMINE SULFATE 2.5; 2.5; 2.5; 2.5 MG/1; MG/1; MG/1; MG/1
10 CAPSULE, EXTENDED RELEASE ORAL 2 TIMES DAILY
Qty: 120 CAPSULE | Refills: 0 | Status: SHIPPED | OUTPATIENT
Start: 2023-08-16 | End: 2023-08-16

## 2023-08-21 DIAGNOSIS — F31.62 BIPOLAR DISORDER, CURRENT EPISODE MIXED, MODERATE (HCC): ICD-10-CM

## 2023-08-21 DIAGNOSIS — J03.01 RECURRENT STREPTOCOCCAL TONSILLITIS: Primary | ICD-10-CM

## 2023-08-23 RX ORDER — OLANZAPINE 10 MG/1
10 TABLET ORAL NIGHTLY
Qty: 90 TABLET | Refills: 1 | Status: SHIPPED | OUTPATIENT
Start: 2023-08-23 | End: 2023-08-31

## 2023-08-23 RX ORDER — OLANZAPINE 5 MG/1
2.5 TABLET ORAL NIGHTLY PRN
Qty: 30 TABLET | Refills: 2 | Status: SHIPPED | OUTPATIENT
Start: 2023-08-23

## 2023-08-30 NOTE — PROGRESS NOTES
PSYCHIATRY PROGRESS NOTE    Narinder Pereira  2000  08/31/2023  Face to Face time: 30 minutes  PCP: Maxine Mccray MD    CC:   Chief Complaint   Patient presents with    Follow-up       Patient is a 26 y.o. female with significant past medical history of spondylosis and choreiform movements who presents to the outpatient psychiatric clinic today for evaluation of depression, anxiety, and manic behaviors. A:  Patient's presentation today is indicative of continued difficulties with ADHD. We will continue to modify her regimen to provide further support. Diagnosis:  Bipolar 1 disorder, past episode moderate mixed symptoms  PTSD  ADHD inattentive subtype predominant  Social anxiety disorder  Cannabis use disorder    P:   Increase Adderall XR to 25mg daily, Adderall IR to 20mg daily. Patient was cautioned regarding adverse effects of the medication as had been described to them previously. Continue olanzapine 15mg nightly  UDS ordered for substance monitoring  Lipid profile ordered for olanzapine monitoring    Medication Monitoring:    - PDMP reviewed: Adderall XR 20mg daily 30-day supply filled 8/16/2023. Follow-up: 4 weeks    Safety: Pt was counseled on the potential for increased suicidal ideations and advised on potential options for dealing with these including hotlines, calling the office, or going to the nearest emergency room. __________________________________________________________________________    S:   Patient noted that the camp she was at went well, however she found herself having to use extensive amounts of [employer] Drink in order to make it through the day. This meant up to 4 drinks daily when she ran out of her Adderall. She noted that she still slept every day while at camp and denied a manic episode. She's now just started back at school as of the 21st and is already noticing focus difficulties coming through again.   She noted that they're not to the same

## 2023-08-31 ENCOUNTER — OFFICE VISIT (OUTPATIENT)
Dept: PSYCHIATRY | Age: 23
End: 2023-08-31
Payer: COMMERCIAL

## 2023-08-31 VITALS
HEIGHT: 64 IN | BODY MASS INDEX: 25.61 KG/M2 | DIASTOLIC BLOOD PRESSURE: 62 MMHG | HEART RATE: 84 BPM | WEIGHT: 150 LBS | SYSTOLIC BLOOD PRESSURE: 110 MMHG

## 2023-08-31 DIAGNOSIS — F40.10 SOCIAL ANXIETY DISORDER: ICD-10-CM

## 2023-08-31 DIAGNOSIS — Z79.899 LONG TERM CURRENT USE OF ANTIPSYCHOTIC MEDICATION: ICD-10-CM

## 2023-08-31 DIAGNOSIS — F90.0 ADHD, PREDOMINANTLY INATTENTIVE TYPE: ICD-10-CM

## 2023-08-31 DIAGNOSIS — F43.10 PTSD (POST-TRAUMATIC STRESS DISORDER): ICD-10-CM

## 2023-08-31 DIAGNOSIS — F31.62 BIPOLAR DISORDER, CURRENT EPISODE MIXED, MODERATE (HCC): Primary | ICD-10-CM

## 2023-08-31 DIAGNOSIS — F15.90 AMPHETAMINE USE: ICD-10-CM

## 2023-08-31 PROCEDURE — 1036F TOBACCO NON-USER: CPT | Performed by: STUDENT IN AN ORGANIZED HEALTH CARE EDUCATION/TRAINING PROGRAM

## 2023-08-31 PROCEDURE — 99214 OFFICE O/P EST MOD 30 MIN: CPT | Performed by: STUDENT IN AN ORGANIZED HEALTH CARE EDUCATION/TRAINING PROGRAM

## 2023-08-31 PROCEDURE — G8419 CALC BMI OUT NRM PARAM NOF/U: HCPCS | Performed by: STUDENT IN AN ORGANIZED HEALTH CARE EDUCATION/TRAINING PROGRAM

## 2023-08-31 PROCEDURE — G8427 DOCREV CUR MEDS BY ELIG CLIN: HCPCS | Performed by: STUDENT IN AN ORGANIZED HEALTH CARE EDUCATION/TRAINING PROGRAM

## 2023-08-31 RX ORDER — DEXTROAMPHETAMINE SACCHARATE, AMPHETAMINE ASPARTATE, DEXTROAMPHETAMINE SULFATE AND AMPHETAMINE SULFATE 5; 5; 5; 5 MG/1; MG/1; MG/1; MG/1
20 TABLET ORAL DAILY
Qty: 30 TABLET | Refills: 0 | Status: SHIPPED | OUTPATIENT
Start: 2023-08-31 | End: 2023-09-30 | Stop reason: SDUPTHER

## 2023-08-31 RX ORDER — DEXTROAMPHETAMINE SACCHARATE, AMPHETAMINE ASPARTATE MONOHYDRATE, DEXTROAMPHETAMINE SULFATE AND AMPHETAMINE SULFATE 6.25; 6.25; 6.25; 6.25 MG/1; MG/1; MG/1; MG/1
25 CAPSULE, EXTENDED RELEASE ORAL EVERY MORNING
Qty: 30 CAPSULE | Refills: 0 | Status: SHIPPED | OUTPATIENT
Start: 2023-09-13 | End: 2023-10-13

## 2023-08-31 RX ORDER — OLANZAPINE 15 MG/1
15 TABLET ORAL NIGHTLY
Qty: 30 TABLET | Refills: 3 | Status: SHIPPED | OUTPATIENT
Start: 2023-08-31

## 2023-08-31 ASSESSMENT — PATIENT HEALTH QUESTIONNAIRE - PHQ9
3. TROUBLE FALLING OR STAYING ASLEEP: 1
4. FEELING TIRED OR HAVING LITTLE ENERGY: 1
SUM OF ALL RESPONSES TO PHQ QUESTIONS 1-9: 8
SUM OF ALL RESPONSES TO PHQ9 QUESTIONS 1 & 2: 1
2. FEELING DOWN, DEPRESSED OR HOPELESS: 1
9. THOUGHTS THAT YOU WOULD BE BETTER OFF DEAD, OR OF HURTING YOURSELF: 0
SUM OF ALL RESPONSES TO PHQ QUESTIONS 1-9: 8
8. MOVING OR SPEAKING SO SLOWLY THAT OTHER PEOPLE COULD HAVE NOTICED. OR THE OPPOSITE, BEING SO FIGETY OR RESTLESS THAT YOU HAVE BEEN MOVING AROUND A LOT MORE THAN USUAL: 2
SUM OF ALL RESPONSES TO PHQ QUESTIONS 1-9: 8
7. TROUBLE CONCENTRATING ON THINGS, SUCH AS READING THE NEWSPAPER OR WATCHING TELEVISION: 2
SUM OF ALL RESPONSES TO PHQ QUESTIONS 1-9: 8
1. LITTLE INTEREST OR PLEASURE IN DOING THINGS: 0
5. POOR APPETITE OR OVEREATING: 0
6. FEELING BAD ABOUT YOURSELF - OR THAT YOU ARE A FAILURE OR HAVE LET YOURSELF OR YOUR FAMILY DOWN: 1

## 2023-08-31 ASSESSMENT — ANXIETY QUESTIONNAIRES
7. FEELING AFRAID AS IF SOMETHING AWFUL MIGHT HAPPEN: 0
1. FEELING NERVOUS, ANXIOUS, OR ON EDGE: 1
GAD7 TOTAL SCORE: 6
5. BEING SO RESTLESS THAT IT IS HARD TO SIT STILL: 2
6. BECOMING EASILY ANNOYED OR IRRITABLE: 0
2. NOT BEING ABLE TO STOP OR CONTROL WORRYING: 0
3. WORRYING TOO MUCH ABOUT DIFFERENT THINGS: 1
4. TROUBLE RELAXING: 2

## 2023-09-01 ENCOUNTER — OFFICE VISIT (OUTPATIENT)
Dept: ENT CLINIC | Age: 23
End: 2023-09-01

## 2023-09-01 VITALS
WEIGHT: 153.2 LBS | TEMPERATURE: 98.2 F | DIASTOLIC BLOOD PRESSURE: 67 MMHG | SYSTOLIC BLOOD PRESSURE: 107 MMHG | BODY MASS INDEX: 26.15 KG/M2 | HEIGHT: 64 IN | HEART RATE: 91 BPM

## 2023-09-01 DIAGNOSIS — J35.01 CHRONIC TONSILLITIS: ICD-10-CM

## 2023-09-01 DIAGNOSIS — H61.23 BILATERAL IMPACTED CERUMEN: ICD-10-CM

## 2023-09-01 DIAGNOSIS — J03.91 RECURRENT TONSILLITIS: Primary | ICD-10-CM

## 2023-09-01 ASSESSMENT — ENCOUNTER SYMPTOMS
STRIDOR: 0
EYE ITCHING: 0
VOICE CHANGE: 0
SHORTNESS OF BREATH: 0
SORE THROAT: 1
EYE DISCHARGE: 0
DIARRHEA: 0
FACIAL SWELLING: 0
BACK PAIN: 0
SINUS PAIN: 0
NAUSEA: 0
COUGH: 0
COLOR CHANGE: 0
RHINORRHEA: 0
CHOKING: 0
PHOTOPHOBIA: 0
VOMITING: 0
WHEEZING: 0
CONSTIPATION: 0
TROUBLE SWALLOWING: 0
BLOOD IN STOOL: 0
SINUS PRESSURE: 0

## 2023-09-01 NOTE — PROGRESS NOTES
[facility]  [address] [facility], [facility] [address] [facility], [address]  P: [phone]  F: [phone]       Patient     Mesha Toledo  2000    ChiefComplaint     Chief Complaint   Patient presents with    Other     Has had recurrent strep 2 times this year but over time has had 9 time and just would to have them removed        History of Present Illness     Mesha Toledo is a pleasant 23 y.o. female presents as a new patient to me, known to our practice for recurrent tonsillitis. She experiences about 2 episodes of strep a year. She has chronic tonsillitis with tonsil stones causing bad breath and chronic sore throat issues. When she was younger she had significantly more infections. She has missed over 1 week of school this year. There is a family history of clotting disorder, but she does not have any bleeding or clotting disorders. She does not take any blood thinners. She is interested in tonsillectomy. She has an additional complaint of wax buildup in the ears and would like them cleaned.     Past Medical History     Past Medical History:   Diagnosis Date    Acne     Anxiety and depression     Arthritis of right hip     Asthma     Chorea     saw [facility] neurology    Dyslexia     has IEP    Family history of breast cancer     Low back pain     sp esiBilateral L5 spondylolysis with grade 1 spondylolisthesis    MTHFR mutation     heterozygous    Nosebleed     Oligomenorrhea     probable pcos    Recurrent upper respiratory infection (URI)        Past Surgical History     Past Surgical History:   Procedure Laterality Date    TONSILLECTOMY      TYMPANOSTOMY TUBE PLACEMENT         Family History     Family History   Problem Relation Age of Onset    Depression Mother         Undiagnosed    Depression Father         Seasonal, Sertraline    Heart Disease Paternal Grandfather     Suicide Paternal Cousin     ADHD Paternal Cousin        Social History     Social History     Socioeconomic History

## 2023-09-14 DIAGNOSIS — F90.0 ADHD, PREDOMINANTLY INATTENTIVE TYPE: ICD-10-CM

## 2023-09-14 RX ORDER — DEXTROAMPHETAMINE SACCHARATE, AMPHETAMINE ASPARTATE MONOHYDRATE, DEXTROAMPHETAMINE SULFATE AND AMPHETAMINE SULFATE 6.25; 6.25; 6.25; 6.25 MG/1; MG/1; MG/1; MG/1
25 CAPSULE, EXTENDED RELEASE ORAL EVERY MORNING
Qty: 30 CAPSULE | Refills: 0 | OUTPATIENT
Start: 2023-09-14 | End: 2023-10-14

## 2023-09-30 DIAGNOSIS — F90.0 ADHD, PREDOMINANTLY INATTENTIVE TYPE: ICD-10-CM

## 2023-10-02 RX ORDER — DEXTROAMPHETAMINE SACCHARATE, AMPHETAMINE ASPARTATE, DEXTROAMPHETAMINE SULFATE AND AMPHETAMINE SULFATE 5; 5; 5; 5 MG/1; MG/1; MG/1; MG/1
20 TABLET ORAL DAILY
Qty: 30 TABLET | Refills: 0 | Status: SHIPPED | OUTPATIENT
Start: 2023-10-02 | End: 2023-10-24 | Stop reason: SDUPTHER

## 2023-10-10 DIAGNOSIS — F90.0 ADHD, PREDOMINANTLY INATTENTIVE TYPE: ICD-10-CM

## 2023-10-11 RX ORDER — DEXTROAMPHETAMINE SACCHARATE, AMPHETAMINE ASPARTATE MONOHYDRATE, DEXTROAMPHETAMINE SULFATE AND AMPHETAMINE SULFATE 6.25; 6.25; 6.25; 6.25 MG/1; MG/1; MG/1; MG/1
25 CAPSULE, EXTENDED RELEASE ORAL EVERY MORNING
Qty: 30 CAPSULE | Refills: 0 | Status: SHIPPED | OUTPATIENT
Start: 2023-10-12 | End: 2023-11-08 | Stop reason: SDUPTHER

## 2023-11-08 DIAGNOSIS — F90.0 ADHD, PREDOMINANTLY INATTENTIVE TYPE: ICD-10-CM

## 2023-11-09 RX ORDER — DEXTROAMPHETAMINE SACCHARATE, AMPHETAMINE ASPARTATE MONOHYDRATE, DEXTROAMPHETAMINE SULFATE AND AMPHETAMINE SULFATE 6.25; 6.25; 6.25; 6.25 MG/1; MG/1; MG/1; MG/1
25 CAPSULE, EXTENDED RELEASE ORAL EVERY MORNING
Qty: 30 CAPSULE | Refills: 0 | Status: SHIPPED | OUTPATIENT
Start: 2023-11-09 | End: 2023-12-17 | Stop reason: SDUPTHER

## 2023-11-22 ENCOUNTER — OFFICE VISIT (OUTPATIENT)
Dept: FAMILY MEDICINE CLINIC | Age: 23
End: 2023-11-22
Payer: COMMERCIAL

## 2023-11-22 VITALS
WEIGHT: 158 LBS | HEART RATE: 80 BPM | BODY MASS INDEX: 27.12 KG/M2 | DIASTOLIC BLOOD PRESSURE: 72 MMHG | OXYGEN SATURATION: 98 % | SYSTOLIC BLOOD PRESSURE: 110 MMHG | TEMPERATURE: 97.3 F

## 2023-11-22 DIAGNOSIS — M54.31 SCIATICA OF RIGHT SIDE: Primary | ICD-10-CM

## 2023-11-22 PROCEDURE — 96372 THER/PROPH/DIAG INJ SC/IM: CPT | Performed by: NURSE PRACTITIONER

## 2023-11-22 PROCEDURE — G8427 DOCREV CUR MEDS BY ELIG CLIN: HCPCS | Performed by: NURSE PRACTITIONER

## 2023-11-22 PROCEDURE — 1036F TOBACCO NON-USER: CPT | Performed by: NURSE PRACTITIONER

## 2023-11-22 PROCEDURE — 99213 OFFICE O/P EST LOW 20 MIN: CPT | Performed by: NURSE PRACTITIONER

## 2023-11-22 PROCEDURE — G8419 CALC BMI OUT NRM PARAM NOF/U: HCPCS | Performed by: NURSE PRACTITIONER

## 2023-11-22 PROCEDURE — G8484 FLU IMMUNIZE NO ADMIN: HCPCS | Performed by: NURSE PRACTITIONER

## 2023-11-22 RX ORDER — PREDNISONE 20 MG/1
20 TABLET ORAL DAILY
Qty: 10 TABLET | Refills: 0 | Status: SHIPPED | OUTPATIENT
Start: 2023-11-22 | End: 2023-12-02

## 2023-11-22 RX ORDER — METHYLPREDNISOLONE ACETATE 80 MG/ML
80 INJECTION, SUSPENSION INTRA-ARTICULAR; INTRALESIONAL; INTRAMUSCULAR; SOFT TISSUE ONCE
Status: COMPLETED | OUTPATIENT
Start: 2023-11-22 | End: 2023-11-22

## 2023-11-22 RX ORDER — CYCLOBENZAPRINE HCL 10 MG
10 TABLET ORAL 3 TIMES DAILY PRN
Qty: 21 TABLET | Refills: 0 | Status: SHIPPED | OUTPATIENT
Start: 2023-11-22 | End: 2023-12-02

## 2023-11-22 RX ADMIN — METHYLPREDNISOLONE ACETATE 80 MG: 80 INJECTION, SUSPENSION INTRA-ARTICULAR; INTRALESIONAL; INTRAMUSCULAR; SOFT TISSUE at 12:37

## 2023-11-22 ASSESSMENT — ENCOUNTER SYMPTOMS
EYE ITCHING: 0
EYE REDNESS: 0
NAUSEA: 0
COUGH: 0
BLOOD IN STOOL: 0
CONSTIPATION: 0
ABDOMINAL PAIN: 0
DIARRHEA: 0
SORE THROAT: 0
VOMITING: 0
SINUS PRESSURE: 0
SHORTNESS OF BREATH: 0
CHEST TIGHTNESS: 0
BACK PAIN: 1
WHEEZING: 0
RHINORRHEA: 0
COLOR CHANGE: 0

## 2023-11-22 NOTE — PROGRESS NOTES
Serge Hurt (:  2000) is a 26 y.o. female,Established patient, here for evaluation of the following chief complaint(s):  Back Pain      ASSESSMENT/PLAN:  1. Sciatica of right side  Assessment & Plan: For acute pain, rest, intermittent application of cold packs (later, may switch to heat, but do not sleep on heating pad), analgesics and muscle relaxants are recommended. Discussed longer term treatment plan of prn NSAID's and discussed a home back care exercise program with flexion exercise routine. Proper lifting with avoidance of heavy lifting discussed. Consider Physical Therapy and XRay studies if not improving. Call or return to clinic prn if these symptoms worsen or fail to improve as anticipated. Orders:  -     methylPREDNISolone acetate (DEPO-MEDROL) injection 80 mg; 80 mg, IntraMUSCular, ONCE, 1 dose, On 23 at 1215      No follow-ups on file. SUBJECTIVE/OBJECTIVE:    Back Pain: Patient presents for presents evaluation of low back problems. Symptoms have been present for 1 day and include pain in lumbar region (aching in character; 10/10 in severity) and stiffness in right lower exrtemity . Initial inciting event: none. Symptoms are worst: after prolonged sitting. Alleviating factors identifiable by patient are walking. Exacerbating factors identifiable by patient are sitting and walking uphill. Treatments so far initiated by patient: tylenol and ibuprofen with no improvement  Previous lower back problems:  spondylosis  . Previous workup: none. Previous treatments: none. Current Outpatient Medications   Medication Sig Dispense Refill    predniSONE (DELTASONE) 20 MG tablet Take 1 tablet by mouth daily for 10 days 10 tablet 0    cyclobenzaprine (FLEXERIL) 10 MG tablet Take 1 tablet by mouth 3 times daily as needed for Muscle spasms 21 tablet 0    amphetamine-dextroamphetamine (ADDERALL XR) 25 MG extended release capsule Take 1 capsule by mouth every morning for 30 days.  Max Daily

## 2023-11-22 NOTE — ASSESSMENT & PLAN NOTE
For acute pain, rest, intermittent application of cold packs (later, may switch to heat, but do not sleep on heating pad), analgesics and muscle relaxants are recommended. Discussed longer term treatment plan of prn NSAID's and discussed a home back care exercise program with flexion exercise routine. Proper lifting with avoidance of heavy lifting discussed. Consider Physical Therapy and XRay studies if not improving. Call or return to clinic prn if these symptoms worsen or fail to improve as anticipated.

## 2023-11-27 DIAGNOSIS — F90.0 ADHD, PREDOMINANTLY INATTENTIVE TYPE: ICD-10-CM

## 2023-11-27 RX ORDER — DEXTROAMPHETAMINE SACCHARATE, AMPHETAMINE ASPARTATE, DEXTROAMPHETAMINE SULFATE AND AMPHETAMINE SULFATE 5; 5; 5; 5 MG/1; MG/1; MG/1; MG/1
20 TABLET ORAL DAILY
Qty: 30 TABLET | Refills: 0 | Status: SHIPPED | OUTPATIENT
Start: 2023-11-28 | End: 2024-01-17

## 2023-12-05 ENCOUNTER — OFFICE VISIT (OUTPATIENT)
Dept: INTERNAL MEDICINE CLINIC | Age: 23
End: 2023-12-05
Payer: COMMERCIAL

## 2023-12-05 VITALS
RESPIRATION RATE: 12 BRPM | HEIGHT: 64 IN | DIASTOLIC BLOOD PRESSURE: 78 MMHG | WEIGHT: 156.6 LBS | SYSTOLIC BLOOD PRESSURE: 106 MMHG | OXYGEN SATURATION: 98 % | HEART RATE: 88 BPM | BODY MASS INDEX: 26.73 KG/M2

## 2023-12-05 DIAGNOSIS — Z01.818 PREOP EXAMINATION: Primary | ICD-10-CM

## 2023-12-05 DIAGNOSIS — M43.06 LUMBAR PARS DEFECT: ICD-10-CM

## 2023-12-05 PROCEDURE — 99213 OFFICE O/P EST LOW 20 MIN: CPT | Performed by: INTERNAL MEDICINE

## 2023-12-05 PROCEDURE — G8427 DOCREV CUR MEDS BY ELIG CLIN: HCPCS | Performed by: INTERNAL MEDICINE

## 2023-12-05 PROCEDURE — 1036F TOBACCO NON-USER: CPT | Performed by: INTERNAL MEDICINE

## 2023-12-05 PROCEDURE — G8484 FLU IMMUNIZE NO ADMIN: HCPCS | Performed by: INTERNAL MEDICINE

## 2023-12-05 PROCEDURE — G8419 CALC BMI OUT NRM PARAM NOF/U: HCPCS | Performed by: INTERNAL MEDICINE

## 2023-12-05 SDOH — ECONOMIC STABILITY: FOOD INSECURITY: WITHIN THE PAST 12 MONTHS, YOU WORRIED THAT YOUR FOOD WOULD RUN OUT BEFORE YOU GOT MONEY TO BUY MORE.: PATIENT DECLINED

## 2023-12-05 SDOH — ECONOMIC STABILITY: INCOME INSECURITY: HOW HARD IS IT FOR YOU TO PAY FOR THE VERY BASICS LIKE FOOD, HOUSING, MEDICAL CARE, AND HEATING?: PATIENT DECLINED

## 2023-12-05 SDOH — ECONOMIC STABILITY: FOOD INSECURITY: WITHIN THE PAST 12 MONTHS, THE FOOD YOU BOUGHT JUST DIDN'T LAST AND YOU DIDN'T HAVE MONEY TO GET MORE.: PATIENT DECLINED

## 2023-12-05 SDOH — ECONOMIC STABILITY: HOUSING INSECURITY
IN THE LAST 12 MONTHS, WAS THERE A TIME WHEN YOU DID NOT HAVE A STEADY PLACE TO SLEEP OR SLEPT IN A SHELTER (INCLUDING NOW)?: PATIENT REFUSED

## 2023-12-05 ASSESSMENT — ENCOUNTER SYMPTOMS
DIARRHEA: 0
COUGH: 0
SORE THROAT: 0
ABDOMINAL PAIN: 0
TROUBLE SWALLOWING: 0
SHORTNESS OF BREATH: 0
BACK PAIN: 1
NAUSEA: 0
RHINORRHEA: 0

## 2023-12-05 NOTE — PROGRESS NOTES
etc.)  Revised Cardiac Risk Index Risk factors: None  Measurement of Exercise Tolerance before Surgery >4 Yes    According to the 2014 ACC/AHA pre-operative risk assessment guidelines Glencross Files is a low risk for major cardiac complications during a intermediate risk procedure and may continue as planned.

## 2023-12-05 NOTE — PROGRESS NOTES
PRE-OP INSTRUCTIONS FOR SURGICAL PATIENTS          Our Pre-admission Testing Nurses tried and were unable to reach you today. Please read the attached instructions if you did not listen to your voicemail. Follow all instructions provided to you from your surgeon's office, including your ARRIVAL TIME. Arrange for someone to drive you home and be with you for the first 24 hours after discharge. NOTE: at this time ONLY 2 ADULTS may accompany you   One person encouraged to stay at hospital entire time if outpatient surgery    Enter the MAIN entrance located on [address] and report to the surgical desk on the LEFT side of the lobby. Please park in the parking garage or there is free [employer] available after 7am for your use. Bring your insurance card & photo ID with you to register. Bring your medication list with you with dose and frequency listed (including over the counter medications)  Contact your ordering physician/surgeon for medication instructions as soon as possible, especially if taking blood thinners, aspirin, heart, or diabetic medication. Bariatric surgical patients need to call your surgeon if on diabetic medications (as some may need to be stopped 1-week preop)  A Pre-Surgical History and Physical MUST be completed WITHIN 30 DAYS OR LESS prior to your procedure by your Physician or an Urgent Care. DO NOT EAT ANYTHING 8 hours prior to arrival for surgery. You may have sips of WATER ONLY (up to 8 ounces) 4 hours prior to your arrival for surgery. Then nothing further 4 hours prior to arriving at hospital.   NOTE: ALL Gastric, Bariatric & Bowel surgery patients - you MUST follow your surgeon's instructions regarding eating/drinking as you will have very specific instructions to follow. If you did not receive these, call your surgeon's office immediately. No gum, candy, mints, or ice chips day of procedure.    Please refrain from drinking alcohol the day before or day of your

## 2023-12-07 ENCOUNTER — TELEPHONE (OUTPATIENT)
Dept: ENT CLINIC | Age: 23
End: 2023-12-07

## 2023-12-10 ENCOUNTER — ANESTHESIA EVENT (OUTPATIENT)
Dept: OPERATING ROOM | Age: 23
End: 2023-12-10
Payer: COMMERCIAL

## 2023-12-11 ENCOUNTER — HOSPITAL ENCOUNTER (OUTPATIENT)
Age: 23
Setting detail: OUTPATIENT SURGERY
Discharge: HOME | End: 2023-12-11
Attending: OTOLARYNGOLOGY | Admitting: OTOLARYNGOLOGY
Payer: COMMERCIAL

## 2023-12-11 ENCOUNTER — ANESTHESIA (OUTPATIENT)
Dept: OPERATING ROOM | Age: 23
End: 2023-12-11
Payer: COMMERCIAL

## 2023-12-11 VITALS
HEART RATE: 64 BPM | HEIGHT: 64 IN | DIASTOLIC BLOOD PRESSURE: 72 MMHG | SYSTOLIC BLOOD PRESSURE: 108 MMHG | TEMPERATURE: 97 F | BODY MASS INDEX: 26.87 KG/M2 | OXYGEN SATURATION: 99 % | RESPIRATION RATE: 18 BRPM | WEIGHT: 157.4 LBS

## 2023-12-11 DIAGNOSIS — J35.01 CHRONIC TONSILLITIS: ICD-10-CM

## 2023-12-11 DIAGNOSIS — J03.91 RECURRENT TONSILLITIS: ICD-10-CM

## 2023-12-11 DIAGNOSIS — G89.18 ACUTE POST-OPERATIVE PAIN: Primary | ICD-10-CM

## 2023-12-11 LAB — HCG UR QL: NEGATIVE

## 2023-12-11 PROCEDURE — 7100000011 HC PHASE II RECOVERY - ADDTL 15 MIN: Performed by: OTOLARYNGOLOGY

## 2023-12-11 PROCEDURE — 2580000003 HC RX 258: Performed by: ANESTHESIOLOGY

## 2023-12-11 PROCEDURE — 42826 REMOVAL OF TONSILS: CPT | Performed by: OTOLARYNGOLOGY

## 2023-12-11 PROCEDURE — 6360000002 HC RX W HCPCS: Performed by: NURSE ANESTHETIST, CERTIFIED REGISTERED

## 2023-12-11 PROCEDURE — 2500000003 HC RX 250 WO HCPCS: Performed by: NURSE ANESTHETIST, CERTIFIED REGISTERED

## 2023-12-11 PROCEDURE — 7100000010 HC PHASE II RECOVERY - FIRST 15 MIN: Performed by: OTOLARYNGOLOGY

## 2023-12-11 PROCEDURE — 3700000000 HC ANESTHESIA ATTENDED CARE: Performed by: OTOLARYNGOLOGY

## 2023-12-11 PROCEDURE — 3600000014 HC SURGERY LEVEL 4 ADDTL 15MIN: Performed by: OTOLARYNGOLOGY

## 2023-12-11 PROCEDURE — 2709999900 HC NON-CHARGEABLE SUPPLY: Performed by: OTOLARYNGOLOGY

## 2023-12-11 PROCEDURE — 2500000003 HC RX 250 WO HCPCS: Performed by: OTOLARYNGOLOGY

## 2023-12-11 PROCEDURE — 3700000001 HC ADD 15 MINUTES (ANESTHESIA): Performed by: OTOLARYNGOLOGY

## 2023-12-11 PROCEDURE — 84703 CHORIONIC GONADOTROPIN ASSAY: CPT

## 2023-12-11 PROCEDURE — 7100000000 HC PACU RECOVERY - FIRST 15 MIN: Performed by: OTOLARYNGOLOGY

## 2023-12-11 PROCEDURE — 7100000001 HC PACU RECOVERY - ADDTL 15 MIN: Performed by: OTOLARYNGOLOGY

## 2023-12-11 PROCEDURE — 3600000004 HC SURGERY LEVEL 4 BASE: Performed by: OTOLARYNGOLOGY

## 2023-12-11 RX ORDER — KETAMINE HCL IN NACL, ISO-OSM 20 MG/2 ML
SYRINGE (ML) INJECTION PRN
Status: DISCONTINUED | OUTPATIENT
Start: 2023-12-11 | End: 2023-12-11 | Stop reason: SDUPTHER

## 2023-12-11 RX ORDER — HYDROMORPHONE HYDROCHLORIDE 2 MG/ML
INJECTION, SOLUTION INTRAMUSCULAR; INTRAVENOUS; SUBCUTANEOUS PRN
Status: DISCONTINUED | OUTPATIENT
Start: 2023-12-11 | End: 2023-12-11 | Stop reason: SDUPTHER

## 2023-12-11 RX ORDER — LIDOCAINE HYDROCHLORIDE 20 MG/ML
INJECTION, SOLUTION INTRAVENOUS PRN
Status: DISCONTINUED | OUTPATIENT
Start: 2023-12-11 | End: 2023-12-11 | Stop reason: SDUPTHER

## 2023-12-11 RX ORDER — PROCHLORPERAZINE EDISYLATE 5 MG/ML
5 INJECTION INTRAMUSCULAR; INTRAVENOUS
Status: DISCONTINUED | OUTPATIENT
Start: 2023-12-11 | End: 2023-12-11 | Stop reason: HOSPADM

## 2023-12-11 RX ORDER — ONDANSETRON 2 MG/ML
INJECTION INTRAMUSCULAR; INTRAVENOUS PRN
Status: DISCONTINUED | OUTPATIENT
Start: 2023-12-11 | End: 2023-12-11 | Stop reason: SDUPTHER

## 2023-12-11 RX ORDER — SODIUM CHLORIDE 0.9 % (FLUSH) 0.9 %
5-40 SYRINGE (ML) INJECTION PRN
Status: DISCONTINUED | OUTPATIENT
Start: 2023-12-11 | End: 2023-12-11 | Stop reason: HOSPADM

## 2023-12-11 RX ORDER — FENTANYL CITRATE 50 UG/ML
INJECTION, SOLUTION INTRAMUSCULAR; INTRAVENOUS PRN
Status: DISCONTINUED | OUTPATIENT
Start: 2023-12-11 | End: 2023-12-11 | Stop reason: SDUPTHER

## 2023-12-11 RX ORDER — MEPERIDINE HYDROCHLORIDE 25 MG/ML
12.5 INJECTION INTRAMUSCULAR; INTRAVENOUS; SUBCUTANEOUS EVERY 5 MIN PRN
Status: DISCONTINUED | OUTPATIENT
Start: 2023-12-11 | End: 2023-12-11 | Stop reason: HOSPADM

## 2023-12-11 RX ORDER — SODIUM CHLORIDE 9 MG/ML
INJECTION, SOLUTION INTRAVENOUS PRN
Status: DISCONTINUED | OUTPATIENT
Start: 2023-12-11 | End: 2023-12-11 | Stop reason: HOSPADM

## 2023-12-11 RX ORDER — HYDRALAZINE HYDROCHLORIDE 20 MG/ML
10 INJECTION INTRAMUSCULAR; INTRAVENOUS
Status: DISCONTINUED | OUTPATIENT
Start: 2023-12-11 | End: 2023-12-11 | Stop reason: HOSPADM

## 2023-12-11 RX ORDER — LABETALOL HYDROCHLORIDE 5 MG/ML
10 INJECTION, SOLUTION INTRAVENOUS
Status: DISCONTINUED | OUTPATIENT
Start: 2023-12-11 | End: 2023-12-11 | Stop reason: HOSPADM

## 2023-12-11 RX ORDER — HYDROMORPHONE HYDROCHLORIDE 1 MG/ML
0.5 INJECTION, SOLUTION INTRAMUSCULAR; INTRAVENOUS; SUBCUTANEOUS EVERY 5 MIN PRN
Status: DISCONTINUED | OUTPATIENT
Start: 2023-12-11 | End: 2023-12-11 | Stop reason: HOSPADM

## 2023-12-11 RX ORDER — PROPOFOL 10 MG/ML
INJECTION, EMULSION INTRAVENOUS PRN
Status: DISCONTINUED | OUTPATIENT
Start: 2023-12-11 | End: 2023-12-11 | Stop reason: SDUPTHER

## 2023-12-11 RX ORDER — MIDAZOLAM HYDROCHLORIDE 1 MG/ML
INJECTION INTRAMUSCULAR; INTRAVENOUS PRN
Status: DISCONTINUED | OUTPATIENT
Start: 2023-12-11 | End: 2023-12-11 | Stop reason: SDUPTHER

## 2023-12-11 RX ORDER — SODIUM CHLORIDE, SODIUM LACTATE, POTASSIUM CHLORIDE, CALCIUM CHLORIDE 600; 310; 30; 20 MG/100ML; MG/100ML; MG/100ML; MG/100ML
INJECTION, SOLUTION INTRAVENOUS CONTINUOUS
Status: DISCONTINUED | OUTPATIENT
Start: 2023-12-11 | End: 2023-12-11 | Stop reason: HOSPADM

## 2023-12-11 RX ORDER — DEXMEDETOMIDINE HYDROCHLORIDE 100 UG/ML
INJECTION, SOLUTION INTRAVENOUS PRN
Status: DISCONTINUED | OUTPATIENT
Start: 2023-12-11 | End: 2023-12-11 | Stop reason: SDUPTHER

## 2023-12-11 RX ORDER — DEXAMETHASONE SODIUM PHOSPHATE 4 MG/ML
INJECTION, SOLUTION INTRA-ARTICULAR; INTRALESIONAL; INTRAMUSCULAR; INTRAVENOUS; SOFT TISSUE PRN
Status: DISCONTINUED | OUTPATIENT
Start: 2023-12-11 | End: 2023-12-11 | Stop reason: SDUPTHER

## 2023-12-11 RX ORDER — LIDOCAINE HYDROCHLORIDE AND EPINEPHRINE 10; 10 MG/ML; UG/ML
INJECTION, SOLUTION INFILTRATION; PERINEURAL PRN
Status: DISCONTINUED | OUTPATIENT
Start: 2023-12-11 | End: 2023-12-11 | Stop reason: HOSPADM

## 2023-12-11 RX ORDER — IBUPROFEN 600 MG/1
600 TABLET ORAL EVERY 6 HOURS PRN
Qty: 60 TABLET | Refills: 0 | Status: SHIPPED | OUTPATIENT
Start: 2023-12-11 | End: 2023-12-25

## 2023-12-11 RX ORDER — ACETAMINOPHEN 500 MG
500 TABLET ORAL EVERY 6 HOURS PRN
Qty: 60 TABLET | Refills: 1 | Status: SHIPPED | OUTPATIENT
Start: 2023-12-11

## 2023-12-11 RX ORDER — OXYCODONE HYDROCHLORIDE 5 MG/1
5 TABLET ORAL
Status: DISCONTINUED | OUTPATIENT
Start: 2023-12-11 | End: 2023-12-11 | Stop reason: HOSPADM

## 2023-12-11 RX ORDER — ONDANSETRON 2 MG/ML
4 INJECTION INTRAMUSCULAR; INTRAVENOUS
Status: DISCONTINUED | OUTPATIENT
Start: 2023-12-11 | End: 2023-12-11 | Stop reason: HOSPADM

## 2023-12-11 RX ORDER — OXYCODONE HYDROCHLORIDE 5 MG/1
5 TABLET ORAL EVERY 4 HOURS PRN
Qty: 30 TABLET | Refills: 0 | Status: SHIPPED | OUTPATIENT
Start: 2023-12-11 | End: 2023-12-18

## 2023-12-11 RX ORDER — SUCCINYLCHOLINE/SOD CL,ISO/PF 200MG/10ML
SYRINGE (ML) INTRAVENOUS PRN
Status: DISCONTINUED | OUTPATIENT
Start: 2023-12-11 | End: 2023-12-11 | Stop reason: SDUPTHER

## 2023-12-11 RX ORDER — SODIUM CHLORIDE 0.9 % (FLUSH) 0.9 %
5-40 SYRINGE (ML) INJECTION EVERY 12 HOURS SCHEDULED
Status: DISCONTINUED | OUTPATIENT
Start: 2023-12-11 | End: 2023-12-11 | Stop reason: HOSPADM

## 2023-12-11 RX ADMIN — LIDOCAINE HYDROCHLORIDE 80 MG: 20 INJECTION, SOLUTION INTRAVENOUS at 07:39

## 2023-12-11 RX ADMIN — Medication 120 MG: at 07:39

## 2023-12-11 RX ADMIN — ONDANSETRON 4 MG: 2 INJECTION INTRAMUSCULAR; INTRAVENOUS at 07:36

## 2023-12-11 RX ADMIN — FENTANYL CITRATE 75 MCG: 50 INJECTION, SOLUTION INTRAMUSCULAR; INTRAVENOUS at 07:39

## 2023-12-11 RX ADMIN — PROPOFOL 50 MG: 10 INJECTION, EMULSION INTRAVENOUS at 07:46

## 2023-12-11 RX ADMIN — PROPOFOL 200 MG: 10 INJECTION, EMULSION INTRAVENOUS at 07:39

## 2023-12-11 RX ADMIN — DEXAMETHASONE SODIUM PHOSPHATE 12 MG: 4 INJECTION, SOLUTION INTRAMUSCULAR; INTRAVENOUS at 07:45

## 2023-12-11 RX ADMIN — DEXMEDETOMIDINE HYDROCHLORIDE 4 MCG: 100 INJECTION, SOLUTION INTRAVENOUS at 07:47

## 2023-12-11 RX ADMIN — PROPOFOL 50 MG: 10 INJECTION, EMULSION INTRAVENOUS at 07:42

## 2023-12-11 RX ADMIN — MIDAZOLAM HYDROCHLORIDE 2 MG: 2 INJECTION, SOLUTION INTRAMUSCULAR; INTRAVENOUS at 07:27

## 2023-12-11 RX ADMIN — Medication 20 MG: at 07:39

## 2023-12-11 RX ADMIN — DEXMEDETOMIDINE HYDROCHLORIDE 4 MCG: 100 INJECTION, SOLUTION INTRAVENOUS at 07:39

## 2023-12-11 RX ADMIN — PROPOFOL 40 MG: 10 INJECTION, EMULSION INTRAVENOUS at 08:01

## 2023-12-11 RX ADMIN — SODIUM CHLORIDE, POTASSIUM CHLORIDE, SODIUM LACTATE AND CALCIUM CHLORIDE: 600; 310; 30; 20 INJECTION, SOLUTION INTRAVENOUS at 06:33

## 2023-12-11 RX ADMIN — HYDROMORPHONE HYDROCHLORIDE 0.5 MG: 2 INJECTION, SOLUTION INTRAMUSCULAR; INTRAVENOUS; SUBCUTANEOUS at 08:05

## 2023-12-11 RX ADMIN — HYDROMORPHONE HYDROCHLORIDE 0.5 MG: 2 INJECTION, SOLUTION INTRAMUSCULAR; INTRAVENOUS; SUBCUTANEOUS at 08:01

## 2023-12-11 RX ADMIN — FENTANYL CITRATE 25 MCG: 50 INJECTION, SOLUTION INTRAMUSCULAR; INTRAVENOUS at 07:46

## 2023-12-11 ASSESSMENT — PAIN SCALES - GENERAL: PAINLEVEL_OUTOF10: 0

## 2023-12-11 NOTE — DISCHARGE INSTRUCTIONS
[address] [facility]  Jeannie Nicki D.O.  [address] [facility]. [address]  [phone]    Post-op Tonsillectomy and Adenoidectomy    GENERAL   Expect a sore throat for 10-14 days. At days 5-7 most experience a worsening of their sore throat; this is due to the healing process and is no cause for alarm. Ear pain is common; this is actually referred pain from the throat. The throat will have thick white patches where the tonsils were, these are normal healing areas and are not a sign of infection. Bad breath is also common during the healing process. DIET   It is very important to drink plenty of fluids. Dehydration and not swallowing increase the pain and prolong the healing process. Soft foods are preferable. Avoid hot, spicy or acidic foods. Carbonated beverages tend to be uncomfortable. FEVER   Low grade fever up to 101 is normal within the first 48 hours. Fever greater than 101 should be addressed by calling the number above    BLEEDING   Small amount of blood tinged secretions in saliva or from the nose is common during the first 24 hours. Bright red brisk bleeding that fills up more than an inch in the bottom of a cup needs to be addressed by calling the above number. Additionally if comparable amounts of blood clots are being expelled please call as this is likely from swallowed blood. ACTIVITY   1 week off school/work is required following surgery. Sore throat will persist for up to 2 weeks but you may return to school/work during the second week if you feel up to it. NO strenuous activity, sports, physical education or heavy lifting for 2 weeks. Distraction from pain has been shown to improve pain scores. Any activity such as watching television, playing games, puzzles, music, or reading can help.     MEDICATIONS   Alternate the following medications every 3 hours:   Ibuprofen 600mg every 6 hours (3-4 doses a day)   Acetaminophen 500mg every 6 hours (3-4

## 2023-12-11 NOTE — PROGRESS NOTES
Patient admitted to PACU # 17 from OR at 0810 post TONSILLECTOMY per Dr. Reyes Taylor. Attached to PACU monitoring system and report received from anesthesia provider. Patient was reported to be hemodynamically stable during procedure. Pt arrived to pacu with oral airway in place. Pt on nonrebreather mask at 10 L. Will assess throat once oral airway is out and pt more awake. Pt NSR on monitor. IVF infusing. Will continue to monitor.

## 2023-12-11 NOTE — FLOWSHEET NOTE
Oral airway removed. Pt placed on RA. L tonsil site is dry and black. R tonsil site is slightly red, no bleeding noted.

## 2023-12-11 NOTE — H&P
Interval H&P  See preop note from Dania Russ MD dated 12/5/23  No interval changes  25 yo adult with recurrent tonsillitis, chronic tonsillitis  Proceed with tonsillectomy

## 2023-12-11 NOTE — ANESTHESIA POSTPROCEDURE EVALUATION
Department of Anesthesiology  Postprocedure Note    Patient: Tracey Adjutant  MRN: [id number]  YOB: 2000  Date of evaluation: 12/11/2023      Procedure Summary       Date: 12/11/23 Room / Location: [facility] [place] [facility] [address] [facility]    Anesthesia Start: 0730 Anesthesia Stop: 1479    Procedure: TONSILLECTOMY 12+ Diagnosis:       Recurrent tonsillitis      Chronic tonsillitis      (Recurrent tonsillitis [J03.91])      (Chronic tonsillitis [J35.01])    Surgeons: Karli Ledezma DO Responsible Provider: Ilsa Rivera MD    Anesthesia Type: general ASA Status: 1            Anesthesia Type: No value filed.     Delroy Phase I: Delroy Score: 10    Delroy Phase II:        Anesthesia Post Evaluation    Patient location during evaluation: PACU  Patient participation: complete - patient participated  Level of consciousness: awake and alert  Airway patency: patent  Nausea & Vomiting: no nausea and no vomiting  Complications: no  Cardiovascular status: hemodynamically stable  Respiratory status: acceptable  Hydration status: euvolemic  Multimodal analgesia pain management approach  Pain management: satisfactory to patient

## 2023-12-11 NOTE — OP NOTE
Patient Name: Patricia Velazquez  YOB: 2000  Medical Record Number:  [id number]  Billing Number:  [id number]  Date of Procedure: 12/11/2023  Time: 0730    Pre Operative Diagnoses:  1. Recurrent acute tonsillitis 2. Chronic tonsillitis. Post Operative Diagnoses:  same. Procedure:    1. Tonsillectomy           Surgeon: Nolberto Worrell DO  OR Staff: Circulator: Thania Soni RN; Mihai Corona RN  Scrub Person First: Ann Marie White RN  Anesthesia:  General anesthesia. Findings:    2+ tonsils, cryptic. Adequate hemostasis. Indications: Timoteo Estes is a now 26 y.o. adult with a history of tonsillitis, tonsil stones. Description:   After verification of informed consent, the patient was brought to the operating room and placed in the supine position. General anesthesia was induced. The head of the bed was rotated 90 degrees. The patient was prepped and draped in a standard fashion. A pradeep-richie mouth gag was inserted into the mouth, retracted, and suspended from the wadsworth stand, exposing the oropharynx. The tonsils were 2+, cryptic. Using a curved Allis, the right tonsil was grasped and retracted medially. Bovie electrocautery was used to make an incision in the right anterior tonsillar pillar. The tonsil was then dissected away from the tonsillar fossa using a standard extracapsular dissection technique. The tonsil was then removed and placed in a specimen cup. Suction bovie electrocautery was employed to control bleeding. After achieving hemostasis, attention was turned to the left tonsil and the same procedure was performed with similar findings. The mouth was irrigated with sterile saline. The patient was then taken off suspension, and the mouth gag released and removed from the mouth. The mouth was re-examined using a sweetOhioHealthrt retractor. Any residual bleeding was cauterized with suction bovie. The care of the patient was then turned back over to anesthesia.  The head of the bed rotated 90

## 2023-12-17 DIAGNOSIS — F90.0 ADHD, PREDOMINANTLY INATTENTIVE TYPE: ICD-10-CM

## 2023-12-19 RX ORDER — DEXTROAMPHETAMINE SACCHARATE, AMPHETAMINE ASPARTATE MONOHYDRATE, DEXTROAMPHETAMINE SULFATE AND AMPHETAMINE SULFATE 6.25; 6.25; 6.25; 6.25 MG/1; MG/1; MG/1; MG/1
25 CAPSULE, EXTENDED RELEASE ORAL EVERY MORNING
Qty: 30 CAPSULE | Refills: 0 | Status: SHIPPED | OUTPATIENT
Start: 2023-12-19 | End: 2024-01-15 | Stop reason: SDUPTHER

## 2023-12-20 NOTE — PROGRESS NOTES
issues. Patient noted that she is having some issues with her sleep on a regular basis. She noted that there were a couple of months in the interval where she got too much sleep, now feels that she has a little bit of difficulty getting to sleep with the olanzapine at times. Some of this is complicated by her recent issue with having her tonsils taken out. She is noted to be taking her medications a little bit early as well, taking them around 8:30 PM and trying to get to bed somewhere between 1130 to 1AM.    Patient denied any SI/HI/AVH on evaluation today. ROS:  Review of Systems   Constitutional: Negative. HENT: Negative. Eyes: Negative. Respiratory: Negative. Cardiovascular: Negative. Gastrointestinal: Negative. Endocrine: Negative. Genitourinary: Negative. Musculoskeletal: Negative. Skin: Negative. Allergic/Immunologic: Negative. Neurological: Negative. Hematological: Negative. Psychiatric/Behavioral:  Positive for dysphoric mood and sleep disturbance. The patient is nervous/anxious.          Brief Medical Hx:   Patient Active Problem List   Diagnosis    PTSD (post-traumatic stress disorder)    Bipolar disorder, current episode mixed, moderate (HCC)    Social anxiety disorder    ADHD, predominantly inattentive type    Sciatica of right side    Recurrent tonsillitis    Chronic tonsillitis        Brief Psych Hx:  Hosp: Denied  Diagnoses: Major depression  Med trials: Fluoxetine, trazodone, bupropion, sertraline, lithium  Outpt: History of multiple therapists however none currently  NSSI: Burning (lighter/pan)  Suicide Attempts: Denied    O:  Wt Readings from Last 3 Encounters:   12/21/23 69.5 kg (153 lb 3.2 oz)   12/11/23 71.4 kg (157 lb 6.4 oz)   12/05/23 71 kg (156 lb 9.6 oz)       Vitals:    12/21/23 1544   BP: 110/70   Pulse: 70   Resp: 12   Weight: 69.5 kg (153 lb 3.2 oz)       Mental Status Exam:   Appearance:    Appropriately dressed  Motor: No abnormal

## 2023-12-21 ENCOUNTER — OFFICE VISIT (OUTPATIENT)
Dept: PSYCHIATRY | Age: 23
End: 2023-12-21
Payer: COMMERCIAL

## 2023-12-21 VITALS
WEIGHT: 153.2 LBS | SYSTOLIC BLOOD PRESSURE: 110 MMHG | HEART RATE: 70 BPM | DIASTOLIC BLOOD PRESSURE: 70 MMHG | BODY MASS INDEX: 26.3 KG/M2 | RESPIRATION RATE: 12 BRPM

## 2023-12-21 DIAGNOSIS — F31.62 BIPOLAR DISORDER, CURRENT EPISODE MIXED, MODERATE (HCC): ICD-10-CM

## 2023-12-21 DIAGNOSIS — F15.90 AMPHETAMINE USE: ICD-10-CM

## 2023-12-21 DIAGNOSIS — F90.0 ADHD, PREDOMINANTLY INATTENTIVE TYPE: Primary | ICD-10-CM

## 2023-12-21 PROCEDURE — G8419 CALC BMI OUT NRM PARAM NOF/U: HCPCS | Performed by: STUDENT IN AN ORGANIZED HEALTH CARE EDUCATION/TRAINING PROGRAM

## 2023-12-21 PROCEDURE — G8484 FLU IMMUNIZE NO ADMIN: HCPCS | Performed by: STUDENT IN AN ORGANIZED HEALTH CARE EDUCATION/TRAINING PROGRAM

## 2023-12-21 PROCEDURE — G8427 DOCREV CUR MEDS BY ELIG CLIN: HCPCS | Performed by: STUDENT IN AN ORGANIZED HEALTH CARE EDUCATION/TRAINING PROGRAM

## 2023-12-21 PROCEDURE — 99214 OFFICE O/P EST MOD 30 MIN: CPT | Performed by: STUDENT IN AN ORGANIZED HEALTH CARE EDUCATION/TRAINING PROGRAM

## 2023-12-21 PROCEDURE — 1036F TOBACCO NON-USER: CPT | Performed by: STUDENT IN AN ORGANIZED HEALTH CARE EDUCATION/TRAINING PROGRAM

## 2023-12-21 RX ORDER — DEXTROAMPHETAMINE SACCHARATE, AMPHETAMINE ASPARTATE, DEXTROAMPHETAMINE SULFATE AND AMPHETAMINE SULFATE 5; 5; 5; 5 MG/1; MG/1; MG/1; MG/1
20 TABLET ORAL DAILY
Qty: 30 TABLET | Refills: 0 | Status: SHIPPED | OUTPATIENT
Start: 2023-12-23 | End: 2024-01-22

## 2023-12-22 LAB
AMPHETAMINES UR QL SCN>1000 NG/ML: POSITIVE
BARBITURATES UR QL SCN>200 NG/ML: ABNORMAL
BENZODIAZ UR QL SCN>200 NG/ML: ABNORMAL
CANNABINOIDS UR QL SCN>50 NG/ML: POSITIVE
COCAINE UR QL SCN: ABNORMAL
DRUG SCREEN COMMENT UR-IMP: ABNORMAL
FENTANYL SCREEN, URINE: ABNORMAL
METHADONE UR QL SCN>300 NG/ML: ABNORMAL
OPIATES UR QL SCN>300 NG/ML: ABNORMAL
OXYCODONE UR QL SCN: ABNORMAL
PCP UR QL SCN>25 NG/ML: ABNORMAL
PH UR STRIP: 7 [PH]

## 2023-12-26 DIAGNOSIS — F31.62 BIPOLAR DISORDER, CURRENT EPISODE MIXED, MODERATE (HCC): ICD-10-CM

## 2023-12-27 RX ORDER — OLANZAPINE 15 MG/1
15 TABLET ORAL NIGHTLY
Qty: 30 TABLET | Refills: 3 | Status: SHIPPED | OUTPATIENT
Start: 2023-12-27

## 2024-01-05 ENCOUNTER — OFFICE VISIT (OUTPATIENT)
Dept: ENT CLINIC | Age: 24
End: 2024-01-05

## 2024-01-05 VITALS
HEART RATE: 64 BPM | WEIGHT: 150.8 LBS | RESPIRATION RATE: 16 BRPM | BODY MASS INDEX: 25.74 KG/M2 | HEIGHT: 64 IN | DIASTOLIC BLOOD PRESSURE: 62 MMHG | TEMPERATURE: 98.7 F | SYSTOLIC BLOOD PRESSURE: 98 MMHG

## 2024-01-05 DIAGNOSIS — Z48.89 POSTOPERATIVE VISIT: ICD-10-CM

## 2024-01-05 DIAGNOSIS — Z90.89 HISTORY OF TONSILLECTOMY: Primary | ICD-10-CM

## 2024-01-05 PROCEDURE — 99024 POSTOP FOLLOW-UP VISIT: CPT | Performed by: OTOLARYNGOLOGY

## 2024-01-05 NOTE — PROGRESS NOTES
[facility]  [address], [address]  [place] [address]  P: [phone]  F: [phone]       Patient     Rosalva Hope  2000    ChiefComplaint     Chief Complaint   Patient presents with    Follow-up     Follow up from surgery       History of Present Illness     Rosalva Hope is here for their postop tonsillectomy visit.  There have been no issues with bleeding.  They have returned to normal diet.  No difficulty swallowing.  Pain is resolved at this time.    Past Medical History     Past Medical History:   Diagnosis Date    Acne     ADHD (attention deficit hyperactivity disorder), inattentive type     Anxiety and depression     Arthritis of right hip     Asthma     Bipolar affective disorder, mixed, moderate degree (HCC)     Pt denies    Chorea     saw [facility] neurology    Dyslexia     has IEP    Family history of breast cancer     Fracture of nasal bone     Hearing loss     Low back pain     sp alex, bilateral L5 spondylolysis with grade 1 spondylolisthesis    MTHFR mutation     heterozygous    Nosebleed     ho cauterization    Recurrent upper respiratory infection (URI)        Past Surgical History     Past Surgical History:   Procedure Laterality Date    TONSILLECTOMY      TONSILLECTOMY N/A 12/11/2023    TONSILLECTOMY 12+ performed by Cole Martinez DO at [facility] OR    TYMPANOSTOMY TUBE PLACEMENT      WISDOM TOOTH EXTRACTION         Family History     Family History   Problem Relation Age of Onset    Depression Mother         Undiagnosed    Depression Father         Seasonal, Sertraline    Heart Disease Paternal Grandfather     Suicide Paternal Cousin     ADHD Paternal Cousin        Social History     Social History     Socioeconomic History    Marital status: Single     Spouse name: Not on file    Number of children: 0    Years of education: 13    Highest education level: Some college, no degree   Occupational History    Occupation:  - environmental studies     Employer: [employer]UD[employer]

## 2024-01-15 DIAGNOSIS — F90.0 ADHD, PREDOMINANTLY INATTENTIVE TYPE: ICD-10-CM

## 2024-01-17 RX ORDER — DEXTROAMPHETAMINE SACCHARATE, AMPHETAMINE ASPARTATE, DEXTROAMPHETAMINE SULFATE AND AMPHETAMINE SULFATE 5; 5; 5; 5 MG/1; MG/1; MG/1; MG/1
20 TABLET ORAL DAILY
Qty: 30 TABLET | Refills: 0 | Status: SHIPPED | OUTPATIENT
Start: 2024-01-20 | End: 2024-02-19

## 2024-01-17 RX ORDER — DEXTROAMPHETAMINE SACCHARATE, AMPHETAMINE ASPARTATE MONOHYDRATE, DEXTROAMPHETAMINE SULFATE AND AMPHETAMINE SULFATE 6.25; 6.25; 6.25; 6.25 MG/1; MG/1; MG/1; MG/1
25 CAPSULE, EXTENDED RELEASE ORAL EVERY MORNING
Qty: 30 CAPSULE | Refills: 0 | Status: SHIPPED | OUTPATIENT
Start: 2024-01-17 | End: 2024-02-13 | Stop reason: SDUPTHER

## 2024-02-13 DIAGNOSIS — F90.0 ADHD, PREDOMINANTLY INATTENTIVE TYPE: ICD-10-CM

## 2024-02-13 RX ORDER — DEXTROAMPHETAMINE SACCHARATE, AMPHETAMINE ASPARTATE MONOHYDRATE, DEXTROAMPHETAMINE SULFATE AND AMPHETAMINE SULFATE 6.25; 6.25; 6.25; 6.25 MG/1; MG/1; MG/1; MG/1
25 CAPSULE, EXTENDED RELEASE ORAL EVERY MORNING
Qty: 30 CAPSULE | Refills: 0 | Status: SHIPPED | OUTPATIENT
Start: 2024-02-14 | End: 2024-02-19 | Stop reason: SDUPTHER

## 2024-02-14 NOTE — PROGRESS NOTES
PSYCHIATRY PROGRESS NOTE    Rosalva Hope  2000  02/19/2024  Face to Face time: 30 minutes  PCP: Coty Esposito MD    CC:   Chief Complaint   Patient presents with    Follow-up       Patient is a 23 y.o. non-binary individual with significant past medical history of spondylosis and choreiform movements who presents to the outpatient psychiatric clinic today for evaluation of depression, anxiety, and manic behaviors.      A:  Patient's presentation today is indicative of some continued difficulties with ADHD.  It does appear with the timeline of things that she is a rapid processor of medication and will need dosage to be adjusted accordingly.  We will continue to follow and provide her with ongoing support.    Diagnosis:  Bipolar 1 disorder, past episode moderate mixed symptoms  PTSD  ADHD inattentive subtype predominant  Social anxiety disorder  Cannabis use disorder    P:   1.  Change Adderall XR to twice daily dosing for the 25 mg.  Discontinue Adderall IR at this time.  2.  Continue current medication of olanzapine 50 mg nightly.  3.  Added labs A1c and fasting lipid profile for today's visit.    Medication Monitoring:    - PDMP reviewed: Adderall IR 20 mg daily 30-day supply last filled 1/20/2024.  Adderall ER 25 mg daily 30-day supply last filled 1/17/2024.    Follow-up: 6 weeks    Safety: Pt was counseled on the potential for increased suicidal ideations and advised on potential options for dealing with these including hotlines, calling the office, or going to the nearest emergency room.      __________________________________________________________________________    S:   Patient identified that things were going but that she had some the interesting things go on in the interval between appointments.  She identified that school has been going pretty well but that she is struggling in the afternoons.  She takes her morning ER medication around 930, feels that it wears off around 2 PM, when she

## 2024-02-19 ENCOUNTER — OFFICE VISIT (OUTPATIENT)
Dept: PSYCHIATRY | Age: 24
End: 2024-02-19
Payer: COMMERCIAL

## 2024-02-19 VITALS
HEIGHT: 64 IN | HEART RATE: 77 BPM | DIASTOLIC BLOOD PRESSURE: 62 MMHG | SYSTOLIC BLOOD PRESSURE: 102 MMHG | BODY MASS INDEX: 25.78 KG/M2 | OXYGEN SATURATION: 99 % | WEIGHT: 151 LBS

## 2024-02-19 DIAGNOSIS — F40.10 SOCIAL ANXIETY DISORDER: ICD-10-CM

## 2024-02-19 DIAGNOSIS — F90.0 ADHD, PREDOMINANTLY INATTENTIVE TYPE: ICD-10-CM

## 2024-02-19 DIAGNOSIS — F43.10 PTSD (POST-TRAUMATIC STRESS DISORDER): ICD-10-CM

## 2024-02-19 DIAGNOSIS — Z79.899 LONG TERM CURRENT USE OF ANTIPSYCHOTIC MEDICATION: ICD-10-CM

## 2024-02-19 DIAGNOSIS — F31.62 BIPOLAR DISORDER, CURRENT EPISODE MIXED, MODERATE (HCC): ICD-10-CM

## 2024-02-19 DIAGNOSIS — Z79.899 LONG TERM CURRENT USE OF ANTIPSYCHOTIC MEDICATION: Primary | ICD-10-CM

## 2024-02-19 LAB
CHOLEST SERPL-MCNC: 210 MG/DL (ref 0–199)
HDLC SERPL-MCNC: 77 MG/DL (ref 40–60)
LDL CHOLESTEROL CALCULATED: 110 MG/DL
TRIGL SERPL-MCNC: 115 MG/DL (ref 0–150)
VLDLC SERPL CALC-MCNC: 23 MG/DL

## 2024-02-19 PROCEDURE — G8419 CALC BMI OUT NRM PARAM NOF/U: HCPCS | Performed by: STUDENT IN AN ORGANIZED HEALTH CARE EDUCATION/TRAINING PROGRAM

## 2024-02-19 PROCEDURE — 99214 OFFICE O/P EST MOD 30 MIN: CPT | Performed by: STUDENT IN AN ORGANIZED HEALTH CARE EDUCATION/TRAINING PROGRAM

## 2024-02-19 PROCEDURE — G8484 FLU IMMUNIZE NO ADMIN: HCPCS | Performed by: STUDENT IN AN ORGANIZED HEALTH CARE EDUCATION/TRAINING PROGRAM

## 2024-02-19 PROCEDURE — 1036F TOBACCO NON-USER: CPT | Performed by: STUDENT IN AN ORGANIZED HEALTH CARE EDUCATION/TRAINING PROGRAM

## 2024-02-19 PROCEDURE — G8427 DOCREV CUR MEDS BY ELIG CLIN: HCPCS | Performed by: STUDENT IN AN ORGANIZED HEALTH CARE EDUCATION/TRAINING PROGRAM

## 2024-02-19 RX ORDER — DEXTROAMPHETAMINE SACCHARATE, AMPHETAMINE ASPARTATE MONOHYDRATE, DEXTROAMPHETAMINE SULFATE AND AMPHETAMINE SULFATE 6.25; 6.25; 6.25; 6.25 MG/1; MG/1; MG/1; MG/1
25 CAPSULE, EXTENDED RELEASE ORAL 2 TIMES DAILY
Qty: 60 CAPSULE | Refills: 0 | Status: SHIPPED | OUTPATIENT
Start: 2024-02-19 | End: 2024-03-20

## 2024-02-19 ASSESSMENT — ENCOUNTER SYMPTOMS
RESPIRATORY NEGATIVE: 1
ALLERGIC/IMMUNOLOGIC NEGATIVE: 1
EYES NEGATIVE: 1
GASTROINTESTINAL NEGATIVE: 1

## 2024-02-19 ASSESSMENT — ANXIETY QUESTIONNAIRES
6. BECOMING EASILY ANNOYED OR IRRITABLE: 2
IF YOU CHECKED OFF ANY PROBLEMS ON THIS QUESTIONNAIRE, HOW DIFFICULT HAVE THESE PROBLEMS MADE IT FOR YOU TO DO YOUR WORK, TAKE CARE OF THINGS AT HOME, OR GET ALONG WITH OTHER PEOPLE: SOMEWHAT DIFFICULT
3. WORRYING TOO MUCH ABOUT DIFFERENT THINGS: 2
4. TROUBLE RELAXING: 2
2. NOT BEING ABLE TO STOP OR CONTROL WORRYING: 1
1. FEELING NERVOUS, ANXIOUS, OR ON EDGE: 1
GAD7 TOTAL SCORE: 11
5. BEING SO RESTLESS THAT IT IS HARD TO SIT STILL: 2
7. FEELING AFRAID AS IF SOMETHING AWFUL MIGHT HAPPEN: 1

## 2024-02-19 ASSESSMENT — PATIENT HEALTH QUESTIONNAIRE - PHQ9
8. MOVING OR SPEAKING SO SLOWLY THAT OTHER PEOPLE COULD HAVE NOTICED. OR THE OPPOSITE, BEING SO FIGETY OR RESTLESS THAT YOU HAVE BEEN MOVING AROUND A LOT MORE THAN USUAL: 2
10. IF YOU CHECKED OFF ANY PROBLEMS, HOW DIFFICULT HAVE THESE PROBLEMS MADE IT FOR YOU TO DO YOUR WORK, TAKE CARE OF THINGS AT HOME, OR GET ALONG WITH OTHER PEOPLE: 1
6. FEELING BAD ABOUT YOURSELF - OR THAT YOU ARE A FAILURE OR HAVE LET YOURSELF OR YOUR FAMILY DOWN: 2
7. TROUBLE CONCENTRATING ON THINGS, SUCH AS READING THE NEWSPAPER OR WATCHING TELEVISION: 2
SUM OF ALL RESPONSES TO PHQ QUESTIONS 1-9: 13
9. THOUGHTS THAT YOU WOULD BE BETTER OFF DEAD, OR OF HURTING YOURSELF: 0
SUM OF ALL RESPONSES TO PHQ QUESTIONS 1-9: 13
SUM OF ALL RESPONSES TO PHQ QUESTIONS 1-9: 13
4. FEELING TIRED OR HAVING LITTLE ENERGY: 1
5. POOR APPETITE OR OVEREATING: 1
2. FEELING DOWN, DEPRESSED OR HOPELESS: 1
3. TROUBLE FALLING OR STAYING ASLEEP: 2
1. LITTLE INTEREST OR PLEASURE IN DOING THINGS: 2
SUM OF ALL RESPONSES TO PHQ9 QUESTIONS 1 & 2: 3
SUM OF ALL RESPONSES TO PHQ QUESTIONS 1-9: 13

## 2024-02-20 DIAGNOSIS — F90.0 ADHD, PREDOMINANTLY INATTENTIVE TYPE: ICD-10-CM

## 2024-02-20 LAB
EST. AVERAGE GLUCOSE BLD GHB EST-MCNC: 91.1 MG/DL
HBA1C MFR BLD: 4.8 %

## 2024-02-21 RX ORDER — DEXTROAMPHETAMINE SACCHARATE, AMPHETAMINE ASPARTATE MONOHYDRATE, DEXTROAMPHETAMINE SULFATE AND AMPHETAMINE SULFATE 6.25; 6.25; 6.25; 6.25 MG/1; MG/1; MG/1; MG/1
25 CAPSULE, EXTENDED RELEASE ORAL 2 TIMES DAILY
Qty: 60 CAPSULE | Refills: 0 | OUTPATIENT
Start: 2024-02-21 | End: 2024-03-22

## 2024-02-28 DIAGNOSIS — F90.0 ADHD, PREDOMINANTLY INATTENTIVE TYPE: ICD-10-CM

## 2024-03-23 DIAGNOSIS — F90.0 ADHD, PREDOMINANTLY INATTENTIVE TYPE: ICD-10-CM

## 2024-03-25 RX ORDER — DEXTROAMPHETAMINE SACCHARATE, AMPHETAMINE ASPARTATE MONOHYDRATE, DEXTROAMPHETAMINE SULFATE AND AMPHETAMINE SULFATE 6.25; 6.25; 6.25; 6.25 MG/1; MG/1; MG/1; MG/1
25 CAPSULE, EXTENDED RELEASE ORAL 2 TIMES DAILY
Qty: 60 CAPSULE | Refills: 0 | Status: SHIPPED | OUTPATIENT
Start: 2024-03-28 | End: 2024-04-15

## 2024-03-26 RX ORDER — DEXTROAMPHETAMINE SACCHARATE, AMPHETAMINE ASPARTATE MONOHYDRATE, DEXTROAMPHETAMINE SULFATE AND AMPHETAMINE SULFATE 6.25; 6.25; 6.25; 6.25 MG/1; MG/1; MG/1; MG/1
25 CAPSULE, EXTENDED RELEASE ORAL 2 TIMES DAILY
Qty: 60 CAPSULE | Refills: 0 | OUTPATIENT
Start: 2024-03-26 | End: 2024-04-25

## 2024-04-12 NOTE — PROGRESS NOTES
PSYCHIATRY PROGRESS NOTE    Rosalva Hope  2000  04/15/2024  Face to Face time: 30 minutes  PCP: Coty Esposito MD    CC:   Chief Complaint   Patient presents with    Follow-up       Patient is a 23 y.o. non-binary individual with significant past medical history of spondylosis and choreiform movements who presents to the outpatient psychiatric clinic today for evaluation of depression, anxiety, and manic behaviors.     A:  Patient's presentation today is indicative of continued difficulties with ADHD as well as social anxiety.  We will attempt to adjust her medication regimen to provide her with further support.    Diagnosis:  Bipolar 1 disorder, past episode moderate mixed symptoms  PTSD  ADHD inattentive subtype predominant  Social anxiety disorder  Cannabis use disorder    P:   1.  Discontinue Adderall ER at this time secondary to medication becoming less effective for her.  2.  Increase Adderall IR up to 30 mg twice daily for treatment of ADHD.  Patient was cautioned regarding adverse effects of this medication as have described to her previously.  3.  Add propranolol 10 mg 3 times daily as needed for social anxiety.    4.  Continue olanzapine 15 mg nightly    Medication Monitoring:    - PDMP reviewed: Adderall ER 25 mg twice daily 30-day supply last filled 3/28/2024.  Adderall IR 20 mg daily 30-day supply last filled 1/20/2024.    Follow-up: 4 weeks    Safety: Pt was counseled on the potential for increased suicidal ideations and advised on potential options for dealing with these including hotlines, calling the office, or going to the nearest emergency room.      __________________________________________________________________________    S:   Patient identified that things have been \"interesting\".  She noted the school has not been the best.  She ended up dropping a class because it has been a lot harder for her to focus in that one and there was a \"lack of accommodations\" that made it more

## 2024-04-15 ENCOUNTER — OFFICE VISIT (OUTPATIENT)
Dept: PSYCHIATRY | Age: 24
End: 2024-04-15
Payer: COMMERCIAL

## 2024-04-15 VITALS
BODY MASS INDEX: 25.61 KG/M2 | WEIGHT: 150 LBS | OXYGEN SATURATION: 98 % | SYSTOLIC BLOOD PRESSURE: 98 MMHG | HEIGHT: 64 IN | DIASTOLIC BLOOD PRESSURE: 58 MMHG | HEART RATE: 84 BPM

## 2024-04-15 DIAGNOSIS — F31.62 BIPOLAR DISORDER, CURRENT EPISODE MIXED, MODERATE (HCC): ICD-10-CM

## 2024-04-15 DIAGNOSIS — F90.0 ADHD, PREDOMINANTLY INATTENTIVE TYPE: Primary | ICD-10-CM

## 2024-04-15 DIAGNOSIS — F90.0 ADHD, PREDOMINANTLY INATTENTIVE TYPE: ICD-10-CM

## 2024-04-15 DIAGNOSIS — F40.10 SOCIAL ANXIETY DISORDER: ICD-10-CM

## 2024-04-15 PROCEDURE — 99214 OFFICE O/P EST MOD 30 MIN: CPT | Performed by: STUDENT IN AN ORGANIZED HEALTH CARE EDUCATION/TRAINING PROGRAM

## 2024-04-15 PROCEDURE — G8427 DOCREV CUR MEDS BY ELIG CLIN: HCPCS | Performed by: STUDENT IN AN ORGANIZED HEALTH CARE EDUCATION/TRAINING PROGRAM

## 2024-04-15 PROCEDURE — 1036F TOBACCO NON-USER: CPT | Performed by: STUDENT IN AN ORGANIZED HEALTH CARE EDUCATION/TRAINING PROGRAM

## 2024-04-15 PROCEDURE — G8419 CALC BMI OUT NRM PARAM NOF/U: HCPCS | Performed by: STUDENT IN AN ORGANIZED HEALTH CARE EDUCATION/TRAINING PROGRAM

## 2024-04-15 RX ORDER — OLANZAPINE 15 MG/1
15 TABLET ORAL NIGHTLY
Qty: 30 TABLET | Refills: 3 | Status: SHIPPED | OUTPATIENT
Start: 2024-04-15

## 2024-04-15 RX ORDER — PROPRANOLOL HYDROCHLORIDE 10 MG/1
10 TABLET ORAL 3 TIMES DAILY PRN
Qty: 30 TABLET | Refills: 2 | Status: SHIPPED | OUTPATIENT
Start: 2024-04-15

## 2024-04-15 RX ORDER — DEXTROAMPHETAMINE SACCHARATE, AMPHETAMINE ASPARTATE, DEXTROAMPHETAMINE SULFATE AND AMPHETAMINE SULFATE 7.5; 7.5; 7.5; 7.5 MG/1; MG/1; MG/1; MG/1
30 TABLET ORAL 2 TIMES DAILY
Qty: 60 TABLET | Refills: 0 | Status: SHIPPED | OUTPATIENT
Start: 2024-04-15 | End: 2024-05-15

## 2024-04-15 ASSESSMENT — PATIENT HEALTH QUESTIONNAIRE - PHQ9
SUM OF ALL RESPONSES TO PHQ9 QUESTIONS 1 & 2: 2
SUM OF ALL RESPONSES TO PHQ QUESTIONS 1-9: 12
7. TROUBLE CONCENTRATING ON THINGS, SUCH AS READING THE NEWSPAPER OR WATCHING TELEVISION: MORE THAN HALF THE DAYS
10. IF YOU CHECKED OFF ANY PROBLEMS, HOW DIFFICULT HAVE THESE PROBLEMS MADE IT FOR YOU TO DO YOUR WORK, TAKE CARE OF THINGS AT HOME, OR GET ALONG WITH OTHER PEOPLE: SOMEWHAT DIFFICULT
8. MOVING OR SPEAKING SO SLOWLY THAT OTHER PEOPLE COULD HAVE NOTICED. OR THE OPPOSITE, BEING SO FIGETY OR RESTLESS THAT YOU HAVE BEEN MOVING AROUND A LOT MORE THAN USUAL: MORE THAN HALF THE DAYS
9. THOUGHTS THAT YOU WOULD BE BETTER OFF DEAD, OR OF HURTING YOURSELF: SEVERAL DAYS
3. TROUBLE FALLING OR STAYING ASLEEP: SEVERAL DAYS
SUM OF ALL RESPONSES TO PHQ QUESTIONS 1-9: 11
SUM OF ALL RESPONSES TO PHQ QUESTIONS 1-9: 12
5. POOR APPETITE OR OVEREATING: SEVERAL DAYS
6. FEELING BAD ABOUT YOURSELF - OR THAT YOU ARE A FAILURE OR HAVE LET YOURSELF OR YOUR FAMILY DOWN: MORE THAN HALF THE DAYS
1. LITTLE INTEREST OR PLEASURE IN DOING THINGS: SEVERAL DAYS
SUM OF ALL RESPONSES TO PHQ QUESTIONS 1-9: 12
4. FEELING TIRED OR HAVING LITTLE ENERGY: SEVERAL DAYS
2. FEELING DOWN, DEPRESSED OR HOPELESS: SEVERAL DAYS

## 2024-04-15 ASSESSMENT — ENCOUNTER SYMPTOMS
RESPIRATORY NEGATIVE: 1
EYES NEGATIVE: 1
GASTROINTESTINAL NEGATIVE: 1
ALLERGIC/IMMUNOLOGIC NEGATIVE: 1

## 2024-04-15 ASSESSMENT — ANXIETY QUESTIONNAIRES
4. TROUBLE RELAXING: MORE THAN HALF THE DAYS
IF YOU CHECKED OFF ANY PROBLEMS ON THIS QUESTIONNAIRE, HOW DIFFICULT HAVE THESE PROBLEMS MADE IT FOR YOU TO DO YOUR WORK, TAKE CARE OF THINGS AT HOME, OR GET ALONG WITH OTHER PEOPLE: SOMEWHAT DIFFICULT
5. BEING SO RESTLESS THAT IT IS HARD TO SIT STILL: MORE THAN HALF THE DAYS
GAD7 TOTAL SCORE: 9
7. FEELING AFRAID AS IF SOMETHING AWFUL MIGHT HAPPEN: SEVERAL DAYS
3. WORRYING TOO MUCH ABOUT DIFFERENT THINGS: SEVERAL DAYS
2. NOT BEING ABLE TO STOP OR CONTROL WORRYING: SEVERAL DAYS
6. BECOMING EASILY ANNOYED OR IRRITABLE: SEVERAL DAYS

## 2024-04-16 RX ORDER — DEXTROAMPHETAMINE SACCHARATE, AMPHETAMINE ASPARTATE, DEXTROAMPHETAMINE SULFATE AND AMPHETAMINE SULFATE 7.5; 7.5; 7.5; 7.5 MG/1; MG/1; MG/1; MG/1
30 TABLET ORAL 2 TIMES DAILY
Qty: 60 TABLET | Refills: 0 | OUTPATIENT
Start: 2024-04-16 | End: 2024-05-16

## 2024-05-08 NOTE — PROGRESS NOTES
Topic-appropriate, no SI/HI  Hallucinations:   Denied, not seem to be responding to internal stimuli  Associations:   Intact  Attention/Concentration:   Intact to conversation  Orientation:    Alert and oriented x4  Memory:   Intact  Fund of Knowledge:    Appropriate for age and education  Insight/Judgement:   Intact/intact          5/13/2024     3:10 PM 4/15/2024     2:03 PM   PHQ-9 Questionaire   Little interest or pleasure in doing things 1 1   Feeling down, depressed, or hopeless 1 1   Trouble falling or staying asleep, or sleeping too much 1 1   Feeling tired or having little energy 1 1   Poor appetite or overeating 1 1   Feeling bad about yourself - or that you are a failure or have let yourself or your family down 1 2   Trouble concentrating on things, such as reading the newspaper or watching television 2 2   Moving or speaking so slowly that other people could have noticed. Or the opposite - being so fidgety or restless that you have been moving around a lot more than usual 1 2   Thoughts that you would be better off dead, or of hurting yourself in some way 0 1   PHQ-9 Total Score 9 12   If you checked off any problems, how difficult have these problems made it for you to do your work, take care of things at home, or get along with other people? 0 1         5/13/2024     3:00 PM 4/15/2024     2:00 PM   MOHAN-7 SCREENING   Feeling nervous, anxious, or on edge More than half the days Several days   Not being able to stop or control worrying Several days Several days   Worrying too much about different things More than half the days Several days   Trouble relaxing More than half the days More than half the days   Being so restless that it is hard to sit still Several days More than half the days   Becoming easily annoyed or irritable Several days Several days   Feeling afraid as if something awful might happen Not at all Several days   MOHAN-7 Total Score 9 9   How difficult have these problems made it for you to

## 2024-05-13 ENCOUNTER — OFFICE VISIT (OUTPATIENT)
Dept: PSYCHIATRY | Age: 24
End: 2024-05-13
Payer: COMMERCIAL

## 2024-05-13 VITALS
DIASTOLIC BLOOD PRESSURE: 60 MMHG | HEIGHT: 64 IN | BODY MASS INDEX: 24.59 KG/M2 | SYSTOLIC BLOOD PRESSURE: 92 MMHG | WEIGHT: 144 LBS

## 2024-05-13 DIAGNOSIS — F40.10 SOCIAL ANXIETY DISORDER: ICD-10-CM

## 2024-05-13 DIAGNOSIS — F90.0 ADHD, PREDOMINANTLY INATTENTIVE TYPE: ICD-10-CM

## 2024-05-13 DIAGNOSIS — F31.62 BIPOLAR DISORDER, CURRENT EPISODE MIXED, MODERATE (HCC): ICD-10-CM

## 2024-05-13 DIAGNOSIS — F43.10 PTSD (POST-TRAUMATIC STRESS DISORDER): Primary | ICD-10-CM

## 2024-05-13 PROCEDURE — G8427 DOCREV CUR MEDS BY ELIG CLIN: HCPCS | Performed by: STUDENT IN AN ORGANIZED HEALTH CARE EDUCATION/TRAINING PROGRAM

## 2024-05-13 PROCEDURE — G8420 CALC BMI NORM PARAMETERS: HCPCS | Performed by: STUDENT IN AN ORGANIZED HEALTH CARE EDUCATION/TRAINING PROGRAM

## 2024-05-13 PROCEDURE — 99214 OFFICE O/P EST MOD 30 MIN: CPT | Performed by: STUDENT IN AN ORGANIZED HEALTH CARE EDUCATION/TRAINING PROGRAM

## 2024-05-13 PROCEDURE — 1036F TOBACCO NON-USER: CPT | Performed by: STUDENT IN AN ORGANIZED HEALTH CARE EDUCATION/TRAINING PROGRAM

## 2024-05-13 RX ORDER — DEXTROAMPHETAMINE SACCHARATE, AMPHETAMINE ASPARTATE, DEXTROAMPHETAMINE SULFATE AND AMPHETAMINE SULFATE 7.5; 7.5; 7.5; 7.5 MG/1; MG/1; MG/1; MG/1
30 TABLET ORAL 2 TIMES DAILY
Qty: 60 TABLET | Refills: 0 | Status: SHIPPED | OUTPATIENT
Start: 2024-05-13 | End: 2024-06-15 | Stop reason: SDUPTHER

## 2024-05-13 ASSESSMENT — PATIENT HEALTH QUESTIONNAIRE - PHQ9
10. IF YOU CHECKED OFF ANY PROBLEMS, HOW DIFFICULT HAVE THESE PROBLEMS MADE IT FOR YOU TO DO YOUR WORK, TAKE CARE OF THINGS AT HOME, OR GET ALONG WITH OTHER PEOPLE: NOT DIFFICULT AT ALL
2. FEELING DOWN, DEPRESSED OR HOPELESS: SEVERAL DAYS
SUM OF ALL RESPONSES TO PHQ9 QUESTIONS 1 & 2: 2
5. POOR APPETITE OR OVEREATING: SEVERAL DAYS
SUM OF ALL RESPONSES TO PHQ QUESTIONS 1-9: 9
4. FEELING TIRED OR HAVING LITTLE ENERGY: SEVERAL DAYS
SUM OF ALL RESPONSES TO PHQ QUESTIONS 1-9: 9
8. MOVING OR SPEAKING SO SLOWLY THAT OTHER PEOPLE COULD HAVE NOTICED. OR THE OPPOSITE, BEING SO FIGETY OR RESTLESS THAT YOU HAVE BEEN MOVING AROUND A LOT MORE THAN USUAL: SEVERAL DAYS
9. THOUGHTS THAT YOU WOULD BE BETTER OFF DEAD, OR OF HURTING YOURSELF: NOT AT ALL
7. TROUBLE CONCENTRATING ON THINGS, SUCH AS READING THE NEWSPAPER OR WATCHING TELEVISION: MORE THAN HALF THE DAYS
1. LITTLE INTEREST OR PLEASURE IN DOING THINGS: SEVERAL DAYS
6. FEELING BAD ABOUT YOURSELF - OR THAT YOU ARE A FAILURE OR HAVE LET YOURSELF OR YOUR FAMILY DOWN: SEVERAL DAYS
SUM OF ALL RESPONSES TO PHQ QUESTIONS 1-9: 9
3. TROUBLE FALLING OR STAYING ASLEEP: SEVERAL DAYS
SUM OF ALL RESPONSES TO PHQ QUESTIONS 1-9: 9

## 2024-05-13 ASSESSMENT — ANXIETY QUESTIONNAIRES
3. WORRYING TOO MUCH ABOUT DIFFERENT THINGS: MORE THAN HALF THE DAYS
GAD7 TOTAL SCORE: 9
2. NOT BEING ABLE TO STOP OR CONTROL WORRYING: SEVERAL DAYS
IF YOU CHECKED OFF ANY PROBLEMS ON THIS QUESTIONNAIRE, HOW DIFFICULT HAVE THESE PROBLEMS MADE IT FOR YOU TO DO YOUR WORK, TAKE CARE OF THINGS AT HOME, OR GET ALONG WITH OTHER PEOPLE: SOMEWHAT DIFFICULT
5. BEING SO RESTLESS THAT IT IS HARD TO SIT STILL: SEVERAL DAYS
1. FEELING NERVOUS, ANXIOUS, OR ON EDGE: MORE THAN HALF THE DAYS
4. TROUBLE RELAXING: MORE THAN HALF THE DAYS
6. BECOMING EASILY ANNOYED OR IRRITABLE: SEVERAL DAYS
7. FEELING AFRAID AS IF SOMETHING AWFUL MIGHT HAPPEN: NOT AT ALL

## 2024-05-18 ENCOUNTER — PATIENT MESSAGE (OUTPATIENT)
Dept: INTERNAL MEDICINE CLINIC | Age: 24
End: 2024-05-18

## 2024-05-24 NOTE — TELEPHONE ENCOUNTER
From: Rosalva Hope  To: Dr. Coty Esposito  Sent: 5/18/2024 3:45 PM EDT  Subject: Looking For New Psychiatrist    Hi Dr. Esposito -     I hope you’re doing well. At my last visit with Dr. Tolliver he said he was moving at the end of August. I wanted to ask if you had any suggestions of who I should reach out to and get help from. Please let me know if you have any psychiatrist in mind that would be willing to work with me.     Thank you!  Rosalva

## 2024-06-13 DIAGNOSIS — F40.10 SOCIAL ANXIETY DISORDER: ICD-10-CM

## 2024-06-13 DIAGNOSIS — F90.0 ADHD, PREDOMINANTLY INATTENTIVE TYPE: ICD-10-CM

## 2024-06-13 DIAGNOSIS — F31.62 BIPOLAR DISORDER, CURRENT EPISODE MIXED, MODERATE (HCC): ICD-10-CM

## 2024-06-15 DIAGNOSIS — F90.0 ADHD, PREDOMINANTLY INATTENTIVE TYPE: ICD-10-CM

## 2024-06-15 DIAGNOSIS — F40.10 SOCIAL ANXIETY DISORDER: ICD-10-CM

## 2024-06-15 DIAGNOSIS — F31.62 BIPOLAR DISORDER, CURRENT EPISODE MIXED, MODERATE (HCC): ICD-10-CM

## 2024-06-16 ENCOUNTER — PATIENT MESSAGE (OUTPATIENT)
Dept: INTERNAL MEDICINE CLINIC | Age: 24
End: 2024-06-16

## 2024-06-17 RX ORDER — OLANZAPINE 15 MG/1
15 TABLET ORAL NIGHTLY
Qty: 30 TABLET | Refills: 3 | OUTPATIENT
Start: 2024-06-17

## 2024-06-17 RX ORDER — PROPRANOLOL HYDROCHLORIDE 10 MG/1
10 TABLET ORAL 3 TIMES DAILY PRN
Qty: 30 TABLET | Refills: 2 | Status: SHIPPED | OUTPATIENT
Start: 2024-06-17

## 2024-06-17 RX ORDER — DEXTROAMPHETAMINE SACCHARATE, AMPHETAMINE ASPARTATE, DEXTROAMPHETAMINE SULFATE AND AMPHETAMINE SULFATE 7.5; 7.5; 7.5; 7.5 MG/1; MG/1; MG/1; MG/1
30 TABLET ORAL 2 TIMES DAILY
Qty: 60 TABLET | Refills: 0 | Status: SHIPPED | OUTPATIENT
Start: 2024-06-17 | End: 2024-07-12 | Stop reason: SDUPTHER

## 2024-06-17 RX ORDER — OLANZAPINE 15 MG/1
15 TABLET ORAL NIGHTLY
Qty: 30 TABLET | Refills: 2 | Status: SHIPPED | OUTPATIENT
Start: 2024-06-17 | End: 2024-07-12 | Stop reason: SDUPTHER

## 2024-06-17 RX ORDER — PROPRANOLOL HYDROCHLORIDE 10 MG/1
10 TABLET ORAL 3 TIMES DAILY PRN
Qty: 30 TABLET | Refills: 2 | OUTPATIENT
Start: 2024-06-17

## 2024-06-17 RX ORDER — DEXTROAMPHETAMINE SACCHARATE, AMPHETAMINE ASPARTATE, DEXTROAMPHETAMINE SULFATE AND AMPHETAMINE SULFATE 7.5; 7.5; 7.5; 7.5 MG/1; MG/1; MG/1; MG/1
30 TABLET ORAL 2 TIMES DAILY
Qty: 60 TABLET | Refills: 0 | OUTPATIENT
Start: 2024-06-17 | End: 2024-07-17

## 2024-06-17 NOTE — TELEPHONE ENCOUNTER
Has been addressed by dr Tolliver's staff via alternate mychart encounter.     Closing this encounter

## 2024-07-12 DIAGNOSIS — F31.62 BIPOLAR DISORDER, CURRENT EPISODE MIXED, MODERATE (HCC): ICD-10-CM

## 2024-07-12 DIAGNOSIS — F90.0 ADHD, PREDOMINANTLY INATTENTIVE TYPE: ICD-10-CM

## 2024-07-12 RX ORDER — OLANZAPINE 15 MG/1
15 TABLET ORAL NIGHTLY
Qty: 30 TABLET | Refills: 3 | Status: SHIPPED | OUTPATIENT
Start: 2024-07-12

## 2024-07-12 RX ORDER — DEXTROAMPHETAMINE SACCHARATE, AMPHETAMINE ASPARTATE, DEXTROAMPHETAMINE SULFATE AND AMPHETAMINE SULFATE 7.5; 7.5; 7.5; 7.5 MG/1; MG/1; MG/1; MG/1
30 TABLET ORAL 2 TIMES DAILY
Qty: 60 TABLET | Refills: 0 | Status: SHIPPED | OUTPATIENT
Start: 2024-07-15 | End: 2024-08-14

## 2024-07-22 NOTE — PROGRESS NOTES
PSYCHIATRY PROGRESS NOTE    Rosalva Hope  2000  07/25/2024  Face to Face time: 20 minutes  PCP: Coty Esposito MD    CC:   Chief Complaint   Patient presents with    Follow-up       Patient is a 23 y.o. non-binary individual with significant past medical history of spondylosis and choreiform movements who presents to the outpatient psychiatric clinic today for evaluation of depression, anxiety, and manic behaviors.     A:  Patient's presentation today is indicative of relative stability of their underlying mental health conditions with the current medication regimen in place.    Diagnosis:  Bipolar 1 disorder, past episode moderate mixed symptoms  PTSD  ADHD inattentive subtype predominant  Social anxiety disorder  Cannabis use disorder    P:   1.  Additional 2-week supplies of olanzapine 15 mg nightly and Adderall IR 30 mg twice daily were provided so that the patient can go on her upcoming trip.  Her medications were refilled for afterwards to provide continuity of care    Medication Monitoring:    - PDMP reviewed: Adderall IR 30 mg twice daily 30-day supply last filled 7/15/2024.    Follow-up: None at this time secondary to writer's impending departure    Safety: Pt was counseled on the potential for increased suicidal ideations and advised on potential options for dealing with these including hotlines, calling the office, or going to the nearest emergency room.      __________________________________________________________________________    S:   Patient identified that she has been doing pretty well since her last visit.  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  This is going to present a slight problem for her, as her medications are due to run out within the time that she would be out in [facility].  Patient notes that the medications are doing what she is asking of them, notes that it is a little bit

## 2024-07-25 ENCOUNTER — OFFICE VISIT (OUTPATIENT)
Dept: PSYCHIATRY | Age: 24
End: 2024-07-25
Payer: COMMERCIAL

## 2024-07-25 VITALS
HEIGHT: 64 IN | HEART RATE: 93 BPM | BODY MASS INDEX: 23.9 KG/M2 | WEIGHT: 140 LBS | OXYGEN SATURATION: 98 % | SYSTOLIC BLOOD PRESSURE: 110 MMHG | DIASTOLIC BLOOD PRESSURE: 70 MMHG

## 2024-07-25 DIAGNOSIS — F31.62 BIPOLAR DISORDER, CURRENT EPISODE MIXED, MODERATE (HCC): Primary | ICD-10-CM

## 2024-07-25 DIAGNOSIS — F90.0 ADHD, PREDOMINANTLY INATTENTIVE TYPE: ICD-10-CM

## 2024-07-25 PROCEDURE — 99213 OFFICE O/P EST LOW 20 MIN: CPT | Performed by: STUDENT IN AN ORGANIZED HEALTH CARE EDUCATION/TRAINING PROGRAM

## 2024-07-25 PROCEDURE — 1036F TOBACCO NON-USER: CPT | Performed by: STUDENT IN AN ORGANIZED HEALTH CARE EDUCATION/TRAINING PROGRAM

## 2024-07-25 PROCEDURE — G8427 DOCREV CUR MEDS BY ELIG CLIN: HCPCS | Performed by: STUDENT IN AN ORGANIZED HEALTH CARE EDUCATION/TRAINING PROGRAM

## 2024-07-25 PROCEDURE — G8420 CALC BMI NORM PARAMETERS: HCPCS | Performed by: STUDENT IN AN ORGANIZED HEALTH CARE EDUCATION/TRAINING PROGRAM

## 2024-07-25 RX ORDER — DEXTROAMPHETAMINE SACCHARATE, AMPHETAMINE ASPARTATE, DEXTROAMPHETAMINE SULFATE AND AMPHETAMINE SULFATE 7.5; 7.5; 7.5; 7.5 MG/1; MG/1; MG/1; MG/1
30 TABLET ORAL 2 TIMES DAILY
Qty: 28 TABLET | Refills: 0 | Status: SHIPPED | OUTPATIENT
Start: 2024-07-25 | End: 2024-08-08

## 2024-07-25 RX ORDER — DEXTROAMPHETAMINE SACCHARATE, AMPHETAMINE ASPARTATE, DEXTROAMPHETAMINE SULFATE AND AMPHETAMINE SULFATE 7.5; 7.5; 7.5; 7.5 MG/1; MG/1; MG/1; MG/1
30 TABLET ORAL 2 TIMES DAILY
Qty: 60 TABLET | Refills: 0 | Status: SHIPPED | OUTPATIENT
Start: 2024-10-21 | End: 2024-11-20

## 2024-07-25 RX ORDER — DEXTROAMPHETAMINE SACCHARATE, AMPHETAMINE ASPARTATE, DEXTROAMPHETAMINE SULFATE AND AMPHETAMINE SULFATE 7.5; 7.5; 7.5; 7.5 MG/1; MG/1; MG/1; MG/1
30 TABLET ORAL 2 TIMES DAILY
Qty: 60 TABLET | Refills: 0 | Status: SHIPPED | OUTPATIENT
Start: 2024-08-26 | End: 2024-09-25

## 2024-07-25 RX ORDER — OLANZAPINE 15 MG/1
15 TABLET ORAL NIGHTLY
Qty: 30 TABLET | Refills: 2 | Status: SHIPPED | OUTPATIENT
Start: 2024-08-26

## 2024-07-25 RX ORDER — OLANZAPINE 15 MG/1
15 TABLET ORAL NIGHTLY
Qty: 14 TABLET | Refills: 0 | Status: SHIPPED | OUTPATIENT
Start: 2024-07-25

## 2024-07-25 RX ORDER — DEXTROAMPHETAMINE SACCHARATE, AMPHETAMINE ASPARTATE, DEXTROAMPHETAMINE SULFATE AND AMPHETAMINE SULFATE 7.5; 7.5; 7.5; 7.5 MG/1; MG/1; MG/1; MG/1
30 TABLET ORAL 2 TIMES DAILY
Qty: 60 TABLET | Refills: 0 | Status: SHIPPED | OUTPATIENT
Start: 2024-09-23 | End: 2024-10-23

## 2024-07-26 ENCOUNTER — TELEPHONE (OUTPATIENT)
Dept: PSYCHIATRY | Age: 24
End: 2024-07-26

## 2025-02-21 ENCOUNTER — OFFICE VISIT (OUTPATIENT)
Dept: ENT CLINIC | Age: 25
End: 2025-02-21

## 2025-02-21 VITALS
SYSTOLIC BLOOD PRESSURE: 106 MMHG | BODY MASS INDEX: 23.05 KG/M2 | HEART RATE: 98 BPM | WEIGHT: 135 LBS | OXYGEN SATURATION: 99 % | DIASTOLIC BLOOD PRESSURE: 66 MMHG | HEIGHT: 64 IN | TEMPERATURE: 98.9 F

## 2025-02-21 DIAGNOSIS — H69.02 PATULOUS EUSTACHIAN TUBE, LEFT: ICD-10-CM

## 2025-02-21 DIAGNOSIS — H61.21 IMPACTED CERUMEN OF RIGHT EAR: Primary | ICD-10-CM

## 2025-02-21 ASSESSMENT — ENCOUNTER SYMPTOMS
COUGH: 0
EYE ITCHING: 0
RHINORRHEA: 0
SORE THROAT: 0
SINUS PRESSURE: 0
FACIAL SWELLING: 0
PHOTOPHOBIA: 0
SINUS PAIN: 0
VOICE CHANGE: 0
CHOKING: 0
EYE REDNESS: 0
SHORTNESS OF BREATH: 0
COLOR CHANGE: 0
EYE PAIN: 0
STRIDOR: 0
NAUSEA: 0
TROUBLE SWALLOWING: 0
DIARRHEA: 0

## 2025-02-21 NOTE — PROGRESS NOTES
discharge.      Pupils: Pupils are equal, round, and reactive to light.   Neck:      Thyroid: No thyromegaly.      Trachea: Phonation normal. No tracheal deviation.   Pulmonary:      Effort: Pulmonary effort is normal. No respiratory distress.      Breath sounds: No stridor.   Musculoskeletal:      Cervical back: Neck supple.   Lymphadenopathy:      Cervical: No cervical adenopathy.   Skin:     General: Skin is warm and dry.   Neurological:      Mental Status: Rosalva is alert and oriented to person, place, and time.      Cranial Nerves: No cranial nerve deficit.   Psychiatric:         Behavior: Behavior normal.           Procedure     Removal Impacted Cerumen    An operating microscope was utilized to visualize the external auditory canals using a 4mm speculum.  Cerumen was removed with curettes and davidson suctions on the right side.  The tympanic membrane is intact.  No fluid visualized in the middle ear. No complications.        Assessment and Plan     1. Impacted cerumen of right ear  Right side cerumen removed with instruments.    2. Patulous Eustachian tube, left  Minimal cerumen in the left ear.  The tympanic membrane is slightly bulging.  There is no middle ear fluid.  Symptoms are consistent with a patulous eustachian tube.  Discussed potential PE tube placement.  She is not interested in a tube at this time.  If it worsens she may call for follow-up for tube placement      Return if symptoms worsen or fail to improve.      [ ] Review/order radiology tests   [ ] Independent interpretation of diagnostic test by another provider  [ ] Discussed case with another provider  [ ] High risk of loss of major body function  [ ] Elective major surgery with risk factors    Portions of this note were dictated using Dragon. There may be linguistic errors secondary to the use of this program.

## (undated) DEVICE — ELECTROSURGICAL SUCTION COAGULATOR, 10FR: Brand: CONMED

## (undated) DEVICE — PENCIL SMK EVAC TELSCP 3 M TBNG

## (undated) DEVICE — YANKAUER,OPEN TIP,W/O VENT,STERILE: Brand: MEDLINE INDUSTRIES, INC.

## (undated) DEVICE — SPONGE,TONSIL,DBL STRNG,XRAY,MED,1",STRL: Brand: MEDLINE INDUSTRIES, INC.

## (undated) DEVICE — TUBE SUCT LAPSCP

## (undated) DEVICE — TUBING, SUCTION, 1/4" X 12', STRAIGHT: Brand: MEDLINE

## (undated) DEVICE — TOWEL,OR,DSP,ST,BLUE,DLX,8/PK,10PK/CS: Brand: MEDLINE

## (undated) DEVICE — KIT,ANTI FOG,W/SPONGE & FLUID,SOFT PACK: Brand: MEDLINE

## (undated) DEVICE — TOWEL,STOP FLAG GOLD N-W: Brand: MEDLINE

## (undated) DEVICE — ENT MINOR: Brand: MEDLINE INDUSTRIES, INC.

## (undated) DEVICE — ELECTRODE PT RET AD L9FT HI MOIST COND ADH HYDRGEL CORDED

## (undated) DEVICE — PACK SUCT CATHETER 14FR OPN WHSTL W NO VLV

## (undated) DEVICE — BLADE ES ELASTOMERIC COAT INSUL DURABLE BEND UPTO 90DEG

## (undated) DEVICE — SOLUTION IV 1000ML 0.9% SOD CHL

## (undated) DEVICE — NEEDLE HYPO 25GA L1.5IN BLU POLYPR HUB S STL REG BVL STR

## (undated) DEVICE — GLOVE ORANGE PI 7 1/2   MSG9075